# Patient Record
Sex: MALE | Race: WHITE
[De-identification: names, ages, dates, MRNs, and addresses within clinical notes are randomized per-mention and may not be internally consistent; named-entity substitution may affect disease eponyms.]

---

## 2016-12-28 NOTE — CPEKG
Heart Rate: 78

RR Interval: 769

P-R Interval: 164

QRSD Interval: 86

QT Interval: 400

QTC Interval: 456

P Axis: 43

QRS Axis: -54

T Wave Axis: -37

EKG Severity - ABNORMAL ECG -

EKG Impression: SINUS RHYTHM

EKG Impression: INFERIOR INFARCT, AGE INDETERMINATE

Preliminary Awaiting MD Review

## 2016-12-28 NOTE — DX
AP and lateral chest x-ray 1700 hours.

 

History: Chest pain.

 

Findings: Comparison to January 6, 2015.

 

Heart size remains upper limits of normal. Pulmonary vasculature is mildly prominent centrally stable
 in appearance. There is hazy increased markings inferior aspect right upper lobe could represent are
a of pneumonitis with similar appearance of the right lung base. Patchy infiltrate and consolidation 
is also suspected left lower lobe posteriorly. There are no effusions. Osseous structures are unchang
ed. There is stable mild anterior wedging superior endplate of T12.

 

Impression:

1. Pneumonitis suspected inferior aspect right upper lobe and right lung base with denser consolidati
on/pneumonia left lower lobe.

## 2016-12-28 NOTE — GHP
[f rep st]



                                                            HISTORY AND PHYSICAL





DATE OF ADMISSION:  2016



CHIEF COMPLAINT:  Syncope.



HISTORY:  This is an 87-year-old with past medical historythat includes coronary artery disease and a
trial fibrillation.  He is an extremely poor historian, but he presents with complaints of syncope an
d emesis.  At the time of my evaluation, patient is also stating that he has had 2-3 weeks of cough a
s well as fevers and chills.  In terms of the episode of syncope, when I asked him about that, he chidi
lly cannot give me much information other than to tell me that he was able to activate his alert neck
lace after he fainted.  He is clear that he lost consciousness and fainted.  When I asked him if he i
s weaker than usual, he states he is always weak.  I do note that he has had a couple of hospitalizat
ions here with similar circumstances and has been in nursing homes at least following one of those ad
missions.  He states he did not injure anything when he fell.  He really is not able to give me much 
other history secondary to what appears to be dementia.



PAST MEDICAL HISTORY:  Includes:

1.  Coronary artery disease.

2.  Atrial fibrillation.

3.  History of GI bleed, very brisk bleeding duodenal ulcer, for which he was hospitalized 1 year ago
.

4.  BPH with urinary retention.

5.  C difficile colitis in the past.

6.  In the past, he has had sacral decubitus ulcers.

7.  Diastolic heart failure.



PAST SURGICAL HISTORY:  Includes hip and ankle surgery.



FAMILY HISTORY:  Parents are .



SOCIAL HISTORY:  The patient lives alone.  When I asked him if he has family, he says no; but looking
 at the chart, there is mention of children he has that live not far away.  It is unclear how involve
d they are in his care.



REVIEW OF SYSTEMS:  A 10-point review of systems is obtained and negative except as per HPI.  It shou
ld be mentioned that this is limited by patient's cognitive dysfunction.



MEDICATIONS:  Include:

1.  Centrum Silver.

2.  Flomax.

3.  Protonix.

4.  Proscar.

5.  Clopidogrel.

6.  Aspirin.

7.  Atorvastatin.



ALLERGIES:  No known drug allergies.



PHYSICAL EXAM:  VITAL SIGNS:  /68, heart rate 66, respiratory rate 22, O2 sats 96% on 4 L, temp
erature is 36.7.  GENERAL APPEARANCE:  This is an elderly man.  He is awake and alert.  He is in no d
istress.  He does appear chronically ill.  EYES:  Anicteric; sclerae are injected bilaterally.  HEENT
:  Oropharynx clear.  CARDIOVASCULAR:  RRR.  There is a diffuse holosystolic murmur.  PULMONARY:  Ann
gs are clear to auscultation to anterior exam.  ABDOMEN:  Soft, nontender, nondistended.  EXTREMITIES
:  No clubbing, cyanosis, or edema.  SKIN:  Warm, dry, well perfused.  NEURO/PSYCH:  Patient is orien
lico x2, though he does have evidence of cognitive dysfunction and poor memory.



CLINICAL DATA:  EKG reviewed and interpreted.  It shows sinus rhythm, evidence of an old inferior inf
arct. 



Chest x-ray, reviewed and interpreted independently by myself, shows pneumonitis of the right upper l
obe and right lung base with denser consolidation/pneumonia in the left lower lobe.



LABORATORY DATA:  Notable for white blood cell count of 8.6, hematocrit of 40.  Chemistry is unremark
able.  Troponin is 0.013.



ASSESSMENT AND PLAN:  This is an 87-year-old man presenting with a syncopal episode at home as well a
s left lower lobe pneumonia.

1.  Left lower lobe pneumonia.  Possible aspiration, given patient's cognitive dysfunction.  Started 
on ceftriaxone and azithromycin.  Speech Language Pathology will evaluate.  We will also check a flu 
swab.  We will provide nebs and IS as well as cough and deep breathing treatments as patient does pati
ear to be profoundly weak.  

2.  Syncope, in the setting of pneumonia as per above.  He is also very weak and a very poor historia
n.  Will monitor on telemetry.  Will obtain serial troponins and EKGs.  His last echocardiogram was n
early 1 year ago, so that will be repeated in the morning to evaluate for any acute change.

3.  Atrial fibrillation.  He was previously on blood thinners, but these were discontinued after his 
large GI bleed last year.  He will be monitored on telemetry.  His EKG did show sinus rhythm.  He is 
rate controlled.  

4.  Coronary artery disease.  Will continue his outpatient medications which include statin, aspirin,
 and Plavix.  

5.  History of cerebrovascular accident, for which he is on Plavix and aspirin.  Unclear how this is 
relating to his cognitive dysfunction.  

6.  Acute-on-chronic encephalopathy.  Again, it is unclear what his baseline cognition is, but suspec
t it is slightly better than on presentation.  He is likely having some delirium in the setting of ch
ronic dementia.

7.  Code status.  In the past, he has been full code.  Given his confusion, this is not further addre
ssed, but his code status will be continued.  I will ask for a palliative consult to help clarify his
 goals of care, as it is unclear if he really understands what the question is and does sound as if blanca chong has some family involved, although he denies that they are.



DISPOSITION:  Inpatient status.  Suspect he will need greater than 48-hour stay for evaluation and ma
nagement of above given his multiple active comorbid conditions. 



The patient is new to my care.  Old records reviewed and summarized as per HPI and Past Medical Histo
ry.  Care plan reviewed with ER physician, including plans for treatment of pneumonia.





Job #:  [id number]

## 2016-12-28 NOTE — EDPHY
H & P


Smoking Status: Never smoked


HPI/ROS: 


CHIEF COMPLAINT:  syncope, vomiting, cough 





HISTORY OF PRESENT ILLNESS:  The patient is a 87-year-old male who presents to 

the emergency department via EMS after having a syncopal episode.  Patient 

states that he was walking and became weak.  He fell to the floor.  He awoke 

and had an episode of emesis.  EMS found the patient covered in his own emesis 

on the floor.  He had activated his Alert necklace.  EMS also reports the 

patient was mildly hypotensive and orthostatic on scene.  Patient denies any 

chest pain, shortness of breath or abdominal pain at this time.  He does state 

that he has had a cough for the past few days.  He denies fevers or chills.  No 

leg pain or swelling. He denies any new weakness or numbness.  He has baseline 

residual weakness on the right upper and lower extremity.  They are always 

weak. No visual change.





REVIEW OF SYSTEMS:  


My complete review of systems is negative except as mentioned in the HPI. (

Kate Mata)





Past Medical/Surgical History: 


Includes ACS, acute MI, atrial fibrillation, pneumonia, CVA





Past surgical history:  Includes stent placement, hip replacement





Social history:  The patient lives at home alone.  He does not smoke or use 

alcohol. (Kate Mata)





Physical Exam: 


Vitals noted


GENERAL:  Well-appearing, in no acute distress, alert.


HEENT:  Eyes normal to inspection, normal pharynx, no signs of dehydration.


NECK:  No thyromegaly, no lymphadenopathy, supple.


RESPIRATORY:  Clear to auscultation bilaterally, no rales, rhonchi or wheezing.


CVS:  Regular rate and rhythm, no rubs, murmurs, or gallops.


ABDOMEN:  Soft, nontender, nondistended, no organomegaly.


BACK:  Normal to inspection, no CVA tenderness.


SKIN:  Normal color, no rash, warm, dry.  No pallor.


EXTREMITIES:  No pedal edema, no calf tenderness, no Homans sign or cords, no 

joint swelling.


NEURO/PSYCH:  


Higher functions:  Alert and Oriented x3.  Normal speech and cognition.  Normal 

mood and affect.


Cranial nerves:  Normal as tested.


Cerebellar:  Normal as tested.  Good finger to nose, good heel-to-shin, normal 

gait.


Peripheral exam:  Slightly weak on the right upper and lower extremity.  This 

is when compared to the left. (patient states this is baseline)  Normal 

sensation.  Normal reflexes. (Kate Mata)





Constitutional: 





 Initial Vital Signs











Temperature (C)  37.3 C   12/28/16 16:51


 


Heart Rate  78   12/28/16 16:51


 


Respiratory Rate  23 H  12/28/16 16:51


 


Blood Pressure  141/68 H  12/28/16 16:51


 


O2 Sat (%)  95   12/28/16 16:51








 











O2 Delivery Mode               Room Air

















Allergies/Adverse Reactions: 


 





No Known Allergies Allergy (Unverified 01/14/15 09:19)


 








Home Medications: 














 Medication  Instructions  Recorded


 


Atorvastatin Calcium [Lipitor 40 40 mg PO DAILY 01/04/15





mg (*)]  


 


Clopidogrel Bisulfate [Plavix (*)] 75 mg PO DAILY 01/04/15


 


Tamsulosin HCl [Flomax 0.4 MG (*)] 0.4 mg PO DAILY #30 cap 01/10/15


 


Pantoprazole Sodium [Protonix 40mg 40 mg PO DAILY #30 tab 01/17/15





(*)]  


 


Aspirin EC [Aspirin EC 81 mg (*)] 81 mg PO DAILY 12/28/16


 


Finasteride [Proscar 5 MG (*)] 5 mg PO DAILY 12/28/16


 


Multivit-Min/FA/Lycopene/Lut 1 each PO DAILY 12/28/16





[Centrum Silver Ultra Men's Tab]  














Medical Decision Making





- Diagnostics


EKG Interpretation: 


Sinus rhythm. Q-wave in III, aVF.  No acute ST or T-wave abnormalities. (Kate Mata)





ED Course/Re-evaluation: 


In the emergency department I discussed the plan with the patient answered all 

his questions.  Laboratory studies, chest x-ray and EKG were ordered.





Patient's BNP was mildly elevated at 914. Lactic acid was 1.6.  Sodium was 

mildly high at 145.





Chest x-ray:  The patient was noted to have a left lower lobe pneumonia.





Blood cultures and Levaquin 750 mg IV was ordered.  I discussed the case with 

Dr. Weldon.  She accepted





I discussed the result with the patient.  I answered all his questions. 





1925:  The patient is doing well. No new shortness of breath or complaints. (

Kate Mata)





Differential Diagnosis: 


My differential includes but is not limited to dysrhythmia, ACS, acute MI, 

electrolyte abnormality, sugar abnormality, pneumonia, bacteremia, sepsis (

Kate Mata)





Other Provider: 


10:15 a.m. I was notified by nursing staff the patient has [facility] insurance.  I 

spoke with the [facility] transfer line who states that they request the patient 

stay here.  They also did not have Room in their facility. (Jose Salazar)








- Data Points


Medications Given: 





 








Discontinued Medications





Sodium Chloride (Ns)  500 mls @ 0 mls/hr IV ONCE ONE


   PRN Reason: As Directed


   Stop: 12/28/16 17:13


   Last Admin: 12/28/16 17:16 Dose:  500 mls


Levofloxacin/Dextrose (Levaquin 750 Mg (Premix))  150 mls @ 100 mls/hr IV EDNOW 

ONE


   PRN Reason: Protocol


   Stop: 12/28/16 19:21


   Last Admin: 12/28/16 18:06 Dose:  150 mls











Departure





- Departure


Disposition: [facility]


Clinical Impression: 


 Left lower lobe pneumonia, Syncope, Vomiting


Condition: Good

## 2016-12-29 NOTE — ECHO
[id number]

[id number]



+---------------------------------------------------+      [address]

:                                                   :       [place] [address]

:                                                   :           [phone]

+---------------------------------------------------+       Fax [phone]



                       Adult Echocardiographic Report



+----------------------------------------------------------------------------

--------+

:Name: VIANCA HOPPER HStudy Date: 2016 07:38 AM                    

        :

:MRN: [id number]          Hospital Admission Number: [id number] Amirah lora: ER:

:: 1929          Gender: Male                           Height: 68 i

n       :

:Age: 87 yrs              Race: WH                               Weight: 153 

lb      :

:Reason For Study: syncope                                                   

        :

:                                                                BSA: 1.8 met

ers2    :

:History: Stent/atrial fibrillation                                          

        :

+----------------------------------------------------------------------------

--------+

MMode/2D Measurements & Calculations

LVIDd: 5.9 cm      EDV(Teich): 172.5 ml MV Diam: 3.3 cmAo root diam: 3.4 cm

                                                       LA dimension: 4.6 cm

        _____________________________________________________________



LVOT diam: 2.1 cm

LVOT area: 3.3 cm2



Normal Measurement Values:

+----------------------------------------------------------------------------

----------------------------------+

:LVIDd (3.5-5.7cm)    IVSd (0.6-1.1cm)     LVPWd (0.6-1.1cm)     Aortic Root 

(2.0-3.7cm)Left Atrium (1.5-4.0cm):

:LV Vol(d) (76-115ml) LV Vol(s) (29-48ml)  Ejec Fraction (50-65%)PV Rohit (0.6-

1.2m/s)    TV Rohit (0.4-1.0m/s)    :

:MV E Rohit (0.8-1.0m/s)MV A Rohit (0.3-1.0m/s)LVOT Rohit (0.7-1.2m/s) Asc Ao Rohit (

0.9-1.8m/s)                       :

+----------------------------------------------------------------------------

----------------------------------+

Doppler Measurements & Calculations

MV E max rohit:      MV V2 mean:         Ao mean PG:        LV V1 max:

74.0 cm/sec        51.1 cm/sec         17.3 mmHg          75.0 cm/sec

MV A max rohit:      MV mean PG:         Ao V2 mean:        LV V1 max P.2 cm/sec        1.2 mmHg            195.5 cm/sec       2.3 mmHg

MV E/A: 1.2        MV V2 VTI: 38.5 cm  Ao V2 VTI: 75.7 cm LV V1 mean PG:

                   MV area (1 diam):                      1.1 mmHg

                                       ADAM(I,D): 0.95 cm2 LV V1 mean:

                   8.5 cm2                                49.8 cm/sec

                   MVA(VTI): 1.9 cm2                      LV V1 VTI: 21.7 cm

                   MV Flow area

                   (1diam): 8.5 cm2



        _____________________________________________________________

MR max rohit:        MR(RF 1 diam):      SV(MV 1 diam):     TR max rohit:

510.4 cm/sec       12.5 %              327.7 ml           238.9 cm/sec

MR max PG:                             SI(MV 1 diam):     TR max P.2 mmHg                                                22.8 mmHg

                                       179.6 ml/m2        RAP systole:

                                       SV(LVOT): 71.6 ml  10.0 mmHg

                                                          RVSP(TR):

                                                          32.8 mmHg



        _____________________________________________________________

RF(MV,Ao)(1 diam):

-1.1

RF(MV,LVOT)

(1diam): 0.78



Left Ventricle

The left ventricle is normal in size. There is normal left ventricular wall

thickness. Ejection Fraction = 50-55%. LV basal and mid inferior walls are

akinetic. All remaining segments have normal motion.







Right Ventricle

The right ventricle is normal in size and function.



Atria

The left atrium is moderate to severely dilated. Right atrial size is

normal. The interatrial septum is intact with no evidence for an atrial

septal defect.



Mitral Valve

Chordal MV NIDA without obstruction. There is moderate mitral annular

calcification. There is no evidence of mitral valve prolapse. There is no

mitral valve stenosis. There is moderate mitral regurgitation.



Tricuspid Valve

Normal tricuspid valve. Right ventricular systolic pressure is normal. There

is mild to moderate tricuspid regurgitation.



Aortic Valve

Moderate-Severe Aortic Valve Calcification. Mild valvular aortic stenosis.

AV max PG is 31mmHG. AV mean PG is 17mmHG.



Pulmonic Valve

The pulmonic valve is normal in structure and function. There is no pulmonic

valvular regurgitation.





Great Vessels

The aortic root is normal size.



Pericardium/Pleural

There is no pericardial effusion.



Conclusion

A complete two-dimensional transthoracic echocardiogram was performed (2D,

M-mode, Doppler and color flow Doppler).

Ejection Fraction = 50-55%.

LV basal and mid inferior walls are akinetic. All remaining segments have

normal motion.

The left atrium is moderate to severely dilated.

Chordal MV NIDA without obstruction.

There is moderate mitral annular calcification.

There is moderate mitral regurgitation.

There is mild to moderate tricuspid regurgitation.

Right ventricular systolic pressure is normal.

Moderate-Severe Aortic Valve Calcification

Mild valvular aortic stenosis.

AV max PG is 31mmHG. AV mean PG is 17mmHG.





_____________________________________________________________________________





Final Reading Physician:

                        Halle Mott signed on 2016 09:55 AM

Ordering Physician: Felix Weldon

Performed By: Nadia Desouza, VON

## 2016-12-29 NOTE — HOSPPROG
Hospitalist Progress Note


Assessment/Plan: 


GPC bacteremia - 1/2 BCx's positive for GPC's.  Will add Vanc to Erta while 

awaiting further culture data.  No obvious skin source





LLL PNA - suspect aspiration, wbc's up this am.  Will change atbx to Ertapenem, 

await cultures, add sputum culture.  Speech / swallow eval planned.





Syncope - bradycardia noted.  Echo reviewed, EF 50-55% with basal and mid 

inferior wall akinesis, mild AS and mod MR.  EKG shows Q waves inferior leads, 

which were present in 2015.  


   -cardiology consult planned


   -consider outpt event monitor at d/c


   -cont tele while here





A fib - HR slow, 40's.  No AV jerrod blockers on board.  Cards to consult as 

above





CAD - CP free, continue outpt meds.





H/O CVA - on ASA, plavix





Encephalopathy - ?acute on chronic in setting of infection





DVT PPLX - Lovenox





Full code





Dispo - inpt








Subjective: Pt feels a bit better today.  Denies CP, SOB, cough or fever.  

Overall poor historian.


Objective: 


 Vital Signs











Temp Pulse Resp BP Pulse Ox


 


 36.7 C   48 L  18   100/60   98 


 


 12/28/16 18:54  12/29/16 11:08  12/29/16 11:08  12/29/16 11:08  12/29/16 11:08








 Laboratory Results





 12/29/16 04:58 





 12/29/16 04:58 











- Physical Exam


Constitutional: no apparent distress


Eyes: PERRL


Ears, Nose, Mouth, Throat: moist mucous membranes


Cardiovascular: regular rate and rhythym


Respiratory: no respiratory distress, inspiratory crackles


Gastrointestinal: normoactive bowel sounds, soft, non-tender abdomen


Skin: warm


Neurologic: AAOx3


Psychiatric: poor memory





ICD10 Worksheet


Patient Problems: 


 Problems











Problem Status Diagnosed


 


Left lower lobe pneumonia Acute 


 


Syncope Acute 


 


Vomiting Acute 


 


C. difficile diarrhea Acute 


 


Cervical stenosis of spinal canal Acute 


 


GI bleed Acute 


 


Pneumonia Acute

## 2016-12-29 NOTE — GCON
[f 
rep st]



                                                                    CONSULTATION





CARDIOLOGY CONSULTATION





INDICATIONS FOR CONSULTATION:  Known history of CAD, echocardiogram showing 
wall motion abnormality, mild troponin bump, and possible syncopal event.



HISTORY OF PRESENT ILLNESS:  Partial HPI came from the patient's chart and from 
patient due to patient being mildly confused since admission to hospital.  The 
patient is an 87-year-old male, reporting last evening around 4:00 feeling 
quite weak, his legs gave out, falling to the ground.  Reporting that he landed 
on his back and his head. Reporting no injury but unable to get up.  Reporting 
he was able to push his Life Alert to activate EMS.  He states that he did not 
lose consciousness but is somewhat confused.  Per report from the hospitalist 
services, he did report that he did lose consciousness but was uncertain of how 
long he was actually out.  Patient does report that he has been feeling weaker 
than usual.  He denies at the time of his fall/possible syncopal event any 
palpitations.  Denies any recent history of chest pain, pressure or symptoms 
suggesting ischemia.  Does report history of feeling fatigued for the last few 
weeks.  Denies any orthopnea, PND, edema, sudden weight gain.  Denies any 
symptoms suggestive of TIA or CVA.  He does state that he has a past medical 
history of coronary artery disease, with previous MI in 2013 (sees [facility], procedure done at [facility]).  He has a history of 
hyperlipidemia.  He had been on blood thinners at one point but had been 
discontinued.  At current time, he reports he has no chest pain or shortness of 
breath since hospitalization.  Upon arrival in the emergency department, 
electrocardiogram was done, showing sinus rhythm with Q-waves noted in leads 
III and AVF and nonspecific T-wave abnormalities in inferolateral leads.  His 
initial laboratory studies showed negative troponin. He did undergo 
echocardiogram this morning showing normal ejection fraction at 50% to 55% but 
was noted to have LV basal and mild inferior wall akinesis, moderate mitral 
annular calcification, moderate MR, mild-to-moderate TR. RVSP was estimated to 
be normal.  Moderate-to-severe aortic valve calcification, mild aortic stenosis
, with peak gradient at 31 mmHg and mean gradient at 17 mmHg.  Chest x-ray done 
showed pneumonitis suspected inferior aspect of the right upper lobe and right 
lung base with denser consolidation pneumonia in the left lower lobe.  Patient 
has been started on antibiotic therapy.  He did have a blood culture drawn that 
did show positive for gram-positive cocci.



PAST MEDICAL HISTORY:  

1.  History of coronary artery disease with previous myocardial infarction; 
patient reporting 3 years ago, with stent implantation at [facility].  

2.  History of paroxysmal atrial fibrillation.

3.  History of GI bleed.

4.  BPH with urinary retention.

5.  C difficile in the past.  

6.  Diastolic heart failure.



PAST SURGICAL HISTORY:  Includes hip and ankle surgery, previous percutaneous 
coronary intervention of unknown vessel.



FAMILY HISTORY:  The patient denies any family history of coronary artery 
disease.



SOCIAL HISTORY:  The patient is , lives alone.  He has 3 adult children 
who live in [place].  He is a retired .  He reports occasional 
cigar smoke smoking and no long-term tobacco abuse. Occasional alcohol. Denies 
of any illicit drug use.



ALLERGIES:  The patient has known drug allergies.



HOME MEDICATIONS:  Include Flomax 0.4 mg p.o. daily, multivitamin 1 p.o. daily, 
Protonix 40 mg p.o. daily, Proscar 5 mg p.o. daily, Clopidogrel 75 mg p.o. daily
, aspirin 81 mg p.o. daily, Atorvastatin 40 mg p.o. daily.



REVIEW OF SYSTEMS:  A 10-point review of systems done on this patient, all 
negative except as mentioned above.



PHYSICAL EXAMINATION:  GENERAL APPEARANCE:  Medium built, well groomed, 
 male.  He is alert and orientated to person, place, time, and 
situation.  VITAL SIGNS:  Current vital signs are blood pressure of 94/51.  
Heart rate is 54 and regular respirations are 18, saturating 96% on 1 L nasal 
cannula.  Temperature 36.5 degrees Celsius.  HEENT:  Head is normocephalic.  
Lips and tongue are pink and moist with no signs of cyanosis.  Conjunctiva 
pink.  NECK:  Trachea is midline, +2 carotid pulses bilateral.  No auscultated 
bruits, 5 cm of jugular vein elevation above the sternal notch, with the 
patient sitting at a 45-degree angle.  RESPIRATORY:  Lungs, upper lobes clear, 
diminished in bases bilateral.  No rhonchi, rales or wheezing noted.  No 
accessory muscle use.  No intercostal muscle retraction noted.  CARDIAC:  
Regular rate, regular rhythm.  S1, S2.  No S3 noted. 2/6 systolic murmur along 
left sternal border.  ABDOMEN:  Soft, nontender, bowel sounds x4 quadrants.  No 
organomegaly. SKIN:  Pink, warm, dry.  No cyanosis, no clubbing, no peripheral 
edema.  VASCULAR:  +2 carotids bilateral, +2 radials bilateral, +1 dorsal pedal 
and posterior tibial pulses bilateral.



LABORATORY STUDIES:  Today's white blood cell count is 15.86, hemoglobin 11.5, 
hematocrit 31.1, platelet count of 185.  Sodium 141, potassium 4.4, chloride 107
, CO2 27, BUN 24, creatinine 1.1.  Glucose 90, calcium 8.6.  Patient has had 4 
troponin levels drawn: Initial of 0.013, repeated was 0.033. This morning at 4:
58, it was mildly elevated at 0.038 and repeat troponin this afternoon at 3:00 
was 0.028.  UA showed positive for nitrites, 2+ ketones, RBCs, WBCs, bacteria, 2
+ mucus.  Influenza A and B was negative for flu.  Blood cultures: 1 came back 
for gram-positive cocci.  UA and culture pending.  2nd blood culture pending.



STUDIES:  Chest x-ray as mentioned above.  Echocardiogram as mentioned above.  
Electrocardiogram as mentioned above.



ASSESSMENT AND PLAN:  

1.  Possible syncopal event:  Patient has remained in sinus rhythm per 
emergency department notes and [facility] staffing.  No arrhythmias noted.  He has 
been mildly bradycardic.  He is currently not on any AV jerrod agent.  Does have 
history of paroxysmal atrial fibrillation.  Question if potential arrhythmia 
may have produced the patient's possible syncopal event.  Will continue to 
monitor him on cardiac monitoring.  Potentially his pneumonia and infection 
could have contributed to the syncopal event also.

2.  Coronary artery disease:  Patient denies any chest pain. Noted to have Q-
waves in inferior leads and nonspecific T-wave abnormalities.  This EKG is not 
significantly different from previous EKG, dated January 4, 2015.  Did have him 
on a very mild troponin bump.  He did have a previous echocardiogram done 
during previous hospital admission on January 6, 2015, which did note no wall 
motion abnormality at that time.  No significant change in valvular heart 
disease.  At this time, he will continue on current anti-platelet therapy of 
aspirin and clopidogrel.  Will not start him on beta-blocker due to 
bradycardia.  Will repeat another troponin level tomorrow with a.m. labs.  Due 
to his possible sepsis and pneumonia, and medically treat at this time, but 
before hospital discharge, he should be further risk evaluated by a MPI study. 
   If he does start experiencing chest pressure, or has significant elevation 
in troponin then we may consider proceeding with coronary angiogram.  I have 
requested the nursing staff to get his previous cath reports and any other 
significant cardiology study from [facility].   

3.  Hyperlipidemia:  The patient is currently on statin therapy of 
atorvastatin.  Would like to have him get a fasting lipid panel in a.m.  

4.  Paroxysmal atrial fibrillation: The patient currently in sinus rhythm.  It 
has been noted in his chart that he had been previously on blood thinners, but 
was discontinued due to GI bleed last  year.  Patient reports he is uncertain 
of this.  Will continue him on continuous cardiac monitor.  No AV jerrod agent 
at this time due to bradycardia.

5.  Valvular heart disease:  Patient's echocardiogram showed moderate mitral 
annular calcification, moderate MR, mild-to-moderate TR. mild aortic stenosis.  
This is unchanged from previous echocardiogram.

6.  Lower lobe pneumonia:  The patient's chest x-ray showed lower lobe 
pneumonia.  The patient has positive blood culture for Escherichia coli, has 
been started on antibiotic therapy by Hospitalist Services, including 
vancomycin.  

Thank you for this consultation.  Will be glad to follow along with you.





Job #:  [id number]

MTDD

## 2016-12-30 NOTE — HOSPPROG
Hospitalist Progress Note


Assessment/Plan: 


GPC bacteremia - 1/2 BCx's positive for GPC's.  Staph on UCx, also has e/o LLL 

PNA.  Cont vanc, erta while awaiting further culture data.





LLL PNA - concern for aspiration.  Cont Ertapenem, await cultures and 

sensitivities.  Speech / swallow eval planned.





Syncope - bradycardia noted.  Echo reviewed, EF 50-55% with basal and mid 

inferior wall akinesis, mild AS and mod MR.  EKG shows Q waves inferior leads, 

which were present in 2015.  May have had syncope as a result of infection.


   -cardiology consult appreciated   





A fib - HR slow, 40's at times.  No AV jerrod blockers on board. 





CAD - trop slightly elevated though CP free, continue outpt meds, defer beta 

blocker due to bradycardia


   -Lexiscan prior to dc





H/O CVA - on ASA, plavix





Encephalopathy - ?acute on chronic in setting of infection





DVT PPLX - Lovenox





Full code





Dispo - inpt








Subjective: Pt feels fine.  No complaints.


Objective: 


 Vital Signs











Temp Pulse Resp BP Pulse Ox


 


 36.5 C   52 L  16   118/60   97 


 


 12/30/16 07:46  12/30/16 07:46  12/30/16 07:46  12/30/16 07:46  12/30/16 07:46








 Microbiology











 12/29/16 17:30  - Final





 Sputum, Expectorated 








 Laboratory Results





 12/30/16 04:26 





 12/30/16 04:26 





 











 12/29/16 12/30/16 12/31/16





 05:59 05:59 05:59


 


Intake Total  462 


 


Output Total  300 


 


Balance  162 














- Physical Exam


Constitutional: no apparent distress


Eyes: PERRL


Ears, Nose, Mouth, Throat: moist mucous membranes


Cardiovascular: regular rate and rhythym


Respiratory: no respiratory distress, clear to auscultation


Gastrointestinal: normoactive bowel sounds, soft, non-tender abdomen


Skin: warm


Neurologic: AAOx3


Psychiatric: poor memory





ICD10 Worksheet


Patient Problems: 


 Problems











Problem Status Diagnosed


 


Left lower lobe pneumonia Acute 


 


Palliative care encounter Acute 


 


Syncope Acute 


 


Vomiting Acute 


 


C. difficile diarrhea Acute 


 


Cervical stenosis of spinal canal Acute 


 


GI bleed Acute 


 


Pneumonia Acute

## 2016-12-30 NOTE — PDPCPN
Palliative Care Progress Note


Assessment/Plan: 


Referring provider: Dr Simon





Reason for consult:


Complex medical decision making


Symptom control





HPI:


Jcarlos Trujillo is a 86 yo male with PMH CAD, CHF, a fib, and CHF admitted to 

the hospital for syncope, emesis, cough, fever, chills. Found to have LLL PNA 

and UTI with possible bacteremia with 1/2 blood cultures +. Started on 

antibiotics and fluids with improvement. Palliative care consulted for complex 

medical decision making. 





Met with sister in law Haydee at the bedside this morning with Oziel. Oziel 

stated he felt he was doing well at home until recently when he fell and used 

his life alert to call for help. We discussed his current medical problems with 

UTI, PNA, and possible bacteremia. Oziel stated he is feeling much better and 

was hoping to go home today. We discussed the recommendation by therapies for 

SNF right now for weakness and to help him be independent again. Oziel was 

reluctant to consider any type of rehab as he really just prefers to be at home 

alone. He states he is able to complete all of his ADLs by himself. He does 

have a cleaning lady but otherwise is still driving and independent. With herb'

s permission I spoke with Haydee outside of the room. 





Haydee shared that since his fall 2 years ago Oziel has never really recovered 

in terms of mobility. She feels he is still unsteady on his feet at home with 

worsening over the past few months. She has noticed him becoming more confused 

at times especially over eulalio when he could not remember directions to 

their house. Haydee states it is hard to speak with Oziel about his feelings or 

what is going on because he does not communicate much and will become verbally 

angry if you ask too many questions or go against his wishes. Haydee believes 

he is hoping to die in his home. We discussed his confusion and unsure if this 

is because of his acute situation or more prominent now that he is sicker. 

Haydee states she has noticed at times some confusion but hard to tell when 

this started or if he is safe at home alone. Discussed code status which Haydee 

states she believe his wishes are for continued Full code. She had asked him at 

the time the MOST form was filled out if this was really his wish and he stated 

yes. She believes at that time he fully understood his choice and the risks/

benefits of each option. She believes he is not safe at this time home alone 

and wishes for him to go to rehab at discharge. 





Assessment:


Physical:


- Pain: denies pain


   - Tylenol PRN





- Cough:


   - on nebs


   - oxygen as needed


   - could also add guaifenesin if needed 





Emotional/psychological:


- Acute encephalopathy: unsure what his true baseline is


   - maintain normal routines


   - reassure 





Advanced Care Planning:


   Is patient decisional?: no


   Code Status: Full- confirmed today with WILLIS SOFIA POA: Sister in law Haydee is MDPOA. 





Plan: Will likely need SNF at discharge. Unsure what his mental baseline is. 

Previously lived alone, driving, with only a cleaning person for support. 

Family is not able to be direct caregivers. 





Subjective: 


i'm feeling better can I go home today





Objective: 


Social History: Retired . Has 3 children in [place] and 1 son in 

[place]. 2 brothers live locally in [place]. 





Medication list reviewed 





ROS: 


General: fatigue, weakness


ENT: negative


Resp: dyspnea, cough


GI: negative


: negative


MS: negative


Skin: negative


Neuro: negative


Psych: confusion





Functional assessment:


   PPS: 50%


   Functional status: previously lived alone, now needs some assistance with 

ADLs





 Vital Signs











Temp Pulse Resp BP Pulse Ox


 


 36.2 C   59 L  18   106/69   94 


 


 12/30/16 11:03  12/30/16 11:03  12/30/16 11:03  12/30/16 11:03  12/30/16 11:03








 Microbiology











 12/29/16 17:30  - Final





 Sputum, Expectorated 








 Laboratory Results





 12/30/16 04:26 





 12/30/16 04:26 





 











 12/29/16 12/30/16 12/31/16





 05:59 05:59 05:59


 


Intake Total  462 


 


Output Total  300 


 


Balance  162 














Physical Exam





- Physical Exam


General Appearance: alert, no apparent distress


Respiratory: decreased breath sounds, No respiratory distress, No accessory 

muscle use


Skin: normal color, warm/dry


Extremities: No pedal edema


Neuro/Psych: alert, other (some confusion and poor short term memory)





ICD10 Worksheet


Patient Problems: 


 Problems











Problem Status Diagnosed


 


Left lower lobe pneumonia Acute 


 


Palliative care encounter Acute 


 


Syncope Acute 


 


Vomiting Acute 


 


C. difficile diarrhea Acute 


 


Cervical stenosis of spinal canal Acute 


 


GI bleed Acute 


 


Pneumonia Acute 














- ICD10 Problem Qualifiers


(1) Palliative care encounter

## 2016-12-30 NOTE — PDCARPN
Cardiology Progress Note


Chief Complaint: 


Patient reports does not know why he has to spend this much time in the 

hospital.


Assessment/Plan: 


Assessment:


A 70-year-old male admitted for possible syncopal event on 1/29/2016.  Known 

history of CAD with previous MI 3 years ago done at [facility], 

paroxysmal atrial fibrillation (currently not on anticoagulation due to history 

of GI bleed), valvular heart disease (moderate MR, mild-to-moderate TR,  mild AS

) hypercholesteremia, hypertension.  Patient reporting no symptoms of chest 

pain or shortness breath.  A chest x-ray did show pneumonia, he has had 

positive urine cultures (Stphylococcus Aureus) and blood cultures (Sreptococcus 

Pneumonias) and is currently being treated with IV and miotic therapy of 

vancomycin controlled by the hospitalist services.  On admission 

electrocardiogram noted with Q-waves in leads III and AVF, with nonspecific T-

wave abnormalities in inferior lateral leads (not significantly different from 

previous EKGs).  He was also noted to have mild troponin bump (0.038) and on a 

downward decline, most recent troponin 0.017.  Echocardiogram done 1/29/2016 

showed EF of 50-55%, LV base and mid inferior walls are akinetic LA is 

moderately to severely dilated, chordal MV NIDA without obstruction, moderate MR 

mild-to-moderate TR, RVSP within limits. Mild aortic stenosis with peak 

gradient at 31 mm Hg and mean at 17 mm Hg.  Wall motion is new since previous 

echocardiogram (1/6/2016)  At this time, patient reports he has no further 

episodes of chest pain or symptoms suggesting ischemia.  Reviewing continuous 

cardiac monitoring he has remained sinus bradycardia (rates  40s to 60s) with 

occasional PAC, occasional PVC, and episode of ventricular couplets.  No 

significant pauses or any other malignant arrhythmias.  A.m. electrocardiogram 

is unchanged from admission except for occasional PAC.





Plan:





1.  Syncopal/Near-syncope:  No further events since hospitalization.  No 

malignant arrhythmias noted on telemetry.  Continue telemetry monitoring.  BP 

improved today with antibiotic therapy.  Potential causes event could be 

connected to infection.





2. CAD:  No chest pain, slight troponin elevation which has returned to normal 

levels.  Echo showing normal EF but with basal and mid inferior wall akinesis.  

Medical therapy at that time, continue aspirin and clopidogrel.  No beta-

blocker due to bradycardia.  Will plan on doing [facility] MPI before hospital 

discharge.





3.  Hyperlipidemia:  Fasting lipid panel showed adequate suppression of LDL, 37

, continue on home dose of atorvastatin.





4.  Paroxysmal atrial fibrillation:  Remains in sinus rhythm, no AV jerrod 

agents due to bradycardia.  Not on anticoagulation due to history of GI bleed.  

Continue on aspirin as Plavix as stated above. 





5.  Pneumonia/UTI:  Continue on antibiotic therapy set forth by hospitalist 

services.





12/30/16 15:30





Subjective: 


Patient denies of any chest pain, shortness of breath, lightheadedness, 

palpitations, orthopnea, PND.


Reviewed/Discussed With: hospitalist (Dr Cushing), other (Dr Beavers)


Objective: 





 Vital Signs (8 Hrs)











  Temp Pulse Resp BP Pulse Ox


 


 12/30/16 11:03  36.2 C  59 L  18  106/69  94


 


 12/30/16 07:46  36.5 C  52 L  16  118/60  97








 Intake/Output (24 Hrs)











 12/29/16 12/30/16 12/31/16





 05:59 05:59 05:59


 


Intake Total  462 


 


Output Total  300 


 


Balance  162 


 


Intake:   


 


  Oral (ml)  200 


 


  IV Intake (ml)  262 


 


Output:   


 


  Urine (ml)  300 


 


    Urinal  300 


 


Other:   


 


  Weight  69.4 kg 











Result Diagrams: 


 12/30/16 04:26





 12/30/16 04:26


Cardiac Labs: 





 Cardiac Lab Results (72 Hrs)











  12/30/16 12/29/16 12/29/16





  04:26 15:22 04:58


 


Troponin I  0.017  0.028  0.038 H














  12/28/16





  20:45


 


Troponin I  0.033














- Physical Exam


Constitutional: WDWN, no apparent distress, other (Elderly male)


Ears, Nose, Mouth, Throat: moist mucous membranes


Cardiovascular: regular rate and rhythm, systolic murmur (2/6 upper sternal 

border), pulses symmetric bilat, No no rubs, No jugular vein distention, No 

carotid bruit


Peripheral Pulses: 1+: dorsalis-pedis (R), dorsalis-pedis (L), 2+: carotid (R), 

carotid (L)


Respiratory: other (Lungs diminished in bases bilateral, no rhonchi, rales, or 

wheezing noted.  No accessary muscle use, no intercostal muscle retraction noted

)


Gastrointestinal: normoactive bowel sounds, no tenderness, no masses


Skin: warm, no edema


Neurologic: AAOx3


Psychiatric: cooperative, interactive, following commands





ICD10 Worksheet


Patient Problems: 


 Problems











Problem Status Diagnosed


 


Left lower lobe pneumonia Acute 


 


Palliative care encounter Acute 


 


Syncope Acute 


 


Vomiting Acute 


 


C. difficile diarrhea Acute 


 


Cervical stenosis of spinal canal Acute 


 


GI bleed Acute 


 


Pneumonia Acute

## 2016-12-31 NOTE — PDCARPN
Cardiology Progress Note


Assessment/Plan: 


Assessment/Plan: 87-year-old male with coronary disease.  History of RCA 

stenting in 2014 at [facility].  Other issues include paroxysmal atrial fibrillation

, valvular heart disease with moderate mitral regurgitation and mild aortic 

stenosis.  Was admitted on December 28 with a fall at home.  He was found to 

have pneumonia, urinary infection, and bacteremia.  Troponin was only minimally 

elevated once and then has normalized.





1. coronary disease:  Has an ejection fraction of 50-55% with inferior 

hypokinesis seen on echocardiogram.  This is similar to previous assessment of 

his LV systolic function at [facility].  I do not think that he had acute coronary 

syndrome as an explanation for his fall at home.  Continue dual antiplatelet 

therapy and statin.  He is not on a beta-blocker due to known bradycardia.  

Could consider further risk stratification with outpatient nuclear stress test 

at [facility].  I do not feel that a nuclear stress test is necessary during his 

hospitalization here.





2.:  Bacteremia, likely related to pneumonia.  Being seen by infectious 

disease.  On appropriate antibiotic therapy.





3. Paroxysmal fibrillation:  Currently in sinus rhythm/ sinus bradycardia.  His 

previous anticoagulation was stopped due to history of GI bleed last year.





4. anemia:  History of GI bleed in the past.  Stable here.





5. Bradycardia:  Given the patient's history, I do not think that his fall was 

related to cardiac syncope.  He has had bradycardia but no significant pauses 

on telemetry.  Continue telemetry.





We will sign off.  Please call with questions.  Thank you


























12/31/16 11:18





Subjective: 


 Mr. Maynard denies chest discomfort or dyspnea.  States that the reason he 

came to the hospital was that he was walking, his legs felt very weak, and he 

fell.  He denies dizziness or lightheadedness or chest pain prior to falling.


Objective: 





 Vital Signs (8 Hrs)











  Temp Pulse Resp BP Pulse Ox


 


 12/31/16 08:00  36.4 C  50 L  17  104/52 L  96


 


 12/31/16 03:54  37.1 C  49 L  14  103/52 L  99








 Intake/Output (24 Hrs)











 12/30/16 12/31/16 01/01/17





 05:59 05:59 05:59


 


Intake Total 462 400 


 


Output Total 300 200 


 


Balance 162 200 


 


Intake:   


 


  Oral (ml) 200 400 


 


  IV Intake (ml) 262  


 


Output:   


 


  Urine (ml) 300 200 


 


    Urinal 300 200 


 


Other:   


 


  Weight 69.4 kg  


 


  Number of Voids   


 


    Urinal  1 








 no acute distress


JVP less than 10. Regular rate and rhythm with 2/6 holosystolic murmur at the 

left sternal border and a soft early systolic ejection murmur at the base.


Lungs clear to auscultation bilateral without wheezes rhonchi or rales


Extremities are warm well perfused without cyanosis clubbing edema


Result Diagrams: 


 12/31/16 04:24





 12/31/16 04:24


Cardiac Labs: 





 Cardiac Lab Results (72 Hrs)











  12/30/16 12/29/16 12/29/16





  04:26 15:22 04:58


 


Troponin I  0.017  0.028  0.038 H














  12/28/16





  20:45


 


Troponin I  0.033











EKG: 


 sinus rhythm and sinus bradycardia with occasional PACs





ICD10 Worksheet


Patient Problems: 


 Problems











Problem Status Diagnosed


 


Left lower lobe pneumonia Acute 


 


Palliative care encounter Acute 


 


Syncope Acute 


 


Vomiting Acute 


 


C. difficile diarrhea Acute 


 


Cervical stenosis of spinal canal Acute 


 


GI bleed Acute 


 


Pneumonia Acute

## 2016-12-31 NOTE — HOSPPROG
Hospitalist Progress Note


Assessment/Plan: 


Strep bacteremia - 1/2 BCx's, sensitive to Ceftriaxone.  Suspect source is PNA.

  


   -tailor atbx to Ceftriaxone


   -repeat BCx's today


   -will need PICC once repeat BCx's demonstrate clearance


   -ID consulted, plan for 2 weeks IV atbx





LLL PNA - concern for aspiration on admission so initially treated with 

Ertapenem.  No e/o aspiration per speech/swallow eval.  Change to Ceftriaxone 

as above.





?UTI - initial UA c/w infection and staph on UCx, sensitive to cephalosporin.  

No clear symptoms.  Unclear if he's colonized, though will be treated based on 

above tx plan.  





Syncope - bradycardia noted.  Echo reviewed, EF 50-55% with basal and mid 

inferior wall akinesis, mild AS and mod MR.  EKG shows Q waves inferior leads, 

which were present in 2015.  May have had syncope as a result of infection.


   -cardiology consult appreciated   





A fib - Sinus osman here.  No AV jerrod blockers on board. 





CAD - trop slightly elevated though CP free, continue outpt meds, defer beta 

blocker due to bradycardia


   -Lexiscan prior to dc





H/O CVA - on ASA, plavix





Encephalopathy - acute on chronic in setting of infection, improving





DVT PPLX - Lovenox





Full code





Dispo - cont inpt.  will need SNF at d/c, CM involved.








Subjective: Pt feels well.  He reports persistent cough.  Denies CP, SOB.  No 

fevers.  He remains weak.  No further syncope.  No events on tele.


Objective: 


 Vital Signs











Temp Pulse Resp BP Pulse Ox


 


 36.4 C   50 L  17   104/52 L  96 


 


 12/31/16 08:00  12/31/16 08:00  12/31/16 08:00  12/31/16 08:00  12/31/16 08:00








 Microbiology











 12/28/16 21:14 Urine Culture - Final





 Urine,Clean Catch    Staphylococcus Aureus





    Two [place] Types


 


 12/29/16 17:30  - Final





 Sputum, Expectorated 








 Laboratory Results





 12/31/16 04:24 





 12/31/16 04:24 





 











 12/30/16 12/31/16 01/01/17





 05:59 05:59 05:59


 


Intake Total 462 400 


 


Output Total 300 200 


 


Balance 162 200 














- Physical Exam


Constitutional: no apparent distress


Eyes: PERRL


Ears, Nose, Mouth, Throat: moist mucous membranes


Cardiovascular: regular rate and rhythym


Respiratory: no respiratory distress, clear to auscultation


Gastrointestinal: normoactive bowel sounds, soft, non-tender abdomen


Skin: warm


Neurologic: AAOx3


Psychiatric: interacting appropriately





ICD10 Worksheet


Patient Problems: 


 Problems











Problem Status Diagnosed


 


Left lower lobe pneumonia Acute 


 


Palliative care encounter Acute 


 


Syncope Acute 


 


Vomiting Acute 


 


C. difficile diarrhea Acute 


 


Cervical stenosis of spinal canal Acute 


 


GI bleed Acute 


 


Pneumonia Acute

## 2017-01-01 NOTE — PCMIDPN
Assessment/Plan: 


Assessment:





Left lower lobe lobar pneumonia with Streptococcus pneumoniae bacteremia.  

Isolate is pan sensitive.  Patient is on monotherapy with ceftriaxone 1 g 

daily.  Seems to be improving.  Has an underlying bradycardic arrhythmias which 

is being investigated by Cardiology although the patient clinically appears 

stable.











Plan:


1. Continue ceftriaxone intravenously. 


2. Follow repeat blood cultures.


3. Once patient is stabilized I have some concerns about using fluoroquinolones 

given his underlying bradycardic arrhythmias.  Will confer with Cardiology to 

determine safety of fluoroquinolones in this patient.














01/01/17 15:46





Subjective: 


Patient is sitting up in his chair in his hospital room watching television.  

Looks and feels better than yesterday.  Denies any significant fevers or 

chills.  No new complaints.


Objective: 


Ceftriaxone # 1 Vital Signs











Temp Pulse Resp BP Pulse Ox


 


 36.6 C   47 L  20   97/50 L  96 


 


 01/01/17 11:30  01/01/17 11:30  01/01/17 11:30  01/01/17 11:30  01/01/17 11:30








 Microbiology











 12/29/16 17:30  - Final





 Sputum, Expectorated Sputum Culture - Final


 


 12/28/16 21:14 Urine Culture - Final





 Urine,Clean Catch    Staphylococcus Aureus





    Two [place] Types








 Laboratory Results





 12/31/16 04:24 





 12/31/16 04:24 





 











 12/31/16 01/01/17 01/02/17





 05:59 05:59 05:59


 


Intake Total 400  


 


Output Total 200 550 


 


Balance 200 -550 














- Physical Exam


General Appearance: WD/WN, alert, no apparent distress, non-toxic


Respiratory: crackles (Left lower lobe.), No lungs clear, No normal breath 

sounds (Decreased at bases.)


Cardiac/Chest: bradycardia, irregularly irregular


Extremities: non-tender, normal inspection


Skin: normal color, warm/dry, No rash


Neuro/Psych: alert, normal mood/affect, oriented x 3





ICD10 Worksheet


Patient Problems: 


 Problems











Problem Status Diagnosed


 


Left lower lobe pneumonia Acute 


 


Palliative care encounter Acute 


 


Syncope Acute 


 


Vomiting Acute 


 


C. difficile diarrhea Acute 


 


Cervical stenosis of spinal canal Acute 


 


GI bleed Acute 


 


Pneumonia Acute

## 2017-01-01 NOTE — CPEKG
Heart Rate: 46

RR Interval: 1304

P-R Interval: 168

QRSD Interval: 88

QT Interval: 460

QTC Interval: 403

P Axis: 32

QRS Axis: -40

T Wave Axis: -44

EKG Severity - ABNORMAL ECG -

EKG Impression: SINUS BRADYCARDIA

EKG Impression: PROBABLE INFERIOR INFARCT, AGE INDETERMINATE

Preliminary Awaiting MD Review

## 2017-01-01 NOTE — HOSPPROG
Hospitalist Progress Note


Assessment/Plan: 


*  Severe sepsis - strep pneumo


   -IV ceftriaxone for 2 weeks


   -await negative f/u BC for PICC placement


*  Pneumonia - no evidence for aspiration


*  UTI - MSSA


   -ceftriaxone will cover


*  Bradycardia - asymptomatic


*  Afib


   -rate controlled without meds


*  CAD/RCA stent


   -ASA, plavix, statin


*  Metabolic encephalopathy


   -improved























Subjective: No new complaints.  No dizzy or lightheaded or chest pain


Objective: 


 Vital Signs











Temp Pulse Resp BP Pulse Ox


 


 36.6 C   47 L  20   97/50 L  96 


 


 01/01/17 11:30  01/01/17 11:30  01/01/17 11:30  01/01/17 11:30  01/01/17 11:30








 Microbiology











 12/29/16 17:30  - Final





 Sputum, Expectorated Sputum Culture - Final


 


 12/28/16 21:14 Urine Culture - Final





 Urine,Clean Catch    Staphylococcus Aureus





    Two [place] Types








 Laboratory Results





 12/31/16 04:24 





 12/31/16 04:24 





 











 12/31/16 01/01/17 01/02/17





 05:59 05:59 05:59


 


Intake Total 400  


 


Output Total 200 550 


 


Balance 200 -550 








EKG viewed, my personal interpretation is:  sinus osman, inferior TWI


tele reviewed:  sinus osman alternating with afib, always slow


CXR:   bilateral patchy pneumonia


ECHO:  normal EF, inferior HK











- Physical Exam


Constitutional: no apparent distress, appears nourished, not in pain


Cardiovascular: regular rate and rhythym, no murmur, rub, or gallop


Respiratory: no respiratory distress, no rales or rhonchi, clear to auscultation


Gastrointestinal: normoactive bowel sounds, soft, non-tender abdomen, no 

palpable masses


Skin: no rashes or abrasions, no fluctuance, no induration


Neurologic: AAOx3, sensation intact bilaterally


Psychiatric: interacting appropriately, not anxious, not encephalopathic, 

thought process linear





ICD10 Worksheet


Patient Problems: 


 Problems











Problem Status Diagnosed


 


Left lower lobe pneumonia Acute 


 


Palliative care encounter Acute 


 


Syncope Acute 


 


Vomiting Acute 


 


C. difficile diarrhea Acute 


 


Cervical stenosis of spinal canal Acute 


 


GI bleed Acute 


 


Pneumonia Acute

## 2017-01-02 NOTE — PCMIDPN
Assessment/Plan: 


Assessment/Plan:





1. Pneumococcal bacteremia in setting of pneumonitis/pneumonia:


- f/u blood cx from 12/31/16 are ngtd


-currently on ceftriaxone. Reviewed current med list. Discussed with 

cardiology. No drug interactions noted with levaquin and cardiology is ok for 

patient to be on quinolone. reviewed side effects with patient. 


-Pt lost his IV access as well.


-Will start levaquin PO today prior to discharge. Pt going to SNF.


-He is on D#3/14. 


-f/u with his primary care physician in in 7-10days.


-care coordinated with hospitalist team. 








Subjective: 


Afebrile. Feels better. still with productive cough. denies sob. not wearing 

o2. denies abd pain. soft stools. appetite intact.


Objective: 


 Vital Signs











Temp Pulse Resp BP Pulse Ox


 


 36.3 C   47 L  16   116/59 L  93 


 


 01/02/17 08:00  01/02/17 08:00  01/02/17 08:00  01/02/17 08:00  01/02/17 08:00








 Laboratory Results





 12/31/16 04:24 





 12/31/16 04:24 





 











 01/01/17 01/02/17 01/03/17





 05:59 05:59 05:59


 


Intake Total  350 


 


Output Total 550 325 


 


Balance -550 25 














- Physical Exam


General Appearance: alert, no apparent distress


EENT: No thrush


Respiratory: coarse breath sounds


Cardiac/Chest: bradycardia


Extremities: No swelling


Abdomen: normal bowel sounds, non-tender, soft, No distended


Skin: No erythema





ICD10 Worksheet


Patient Problems: 


 Problems











Problem Status Diagnosed


 


Left lower lobe pneumonia Acute 


 


Palliative care encounter Acute 


 


Syncope Acute 


 


Vomiting Acute 


 


C. difficile diarrhea Acute 


 


Cervical stenosis of spinal canal Acute 


 


GI bleed Acute 


 


Pneumonia Acute

## 2017-01-02 NOTE — PDIAF
- Diagnosis


Diagnosis: pneumonia with sepsis


Code Status: Full Code





- Medication Management


Discharge Medications: 


 Medications to Continue on Transfer





Atorvastatin Calcium [Lipitor 40 mg (*)] 40 mg PO DAILY 01/04/15 [Last Taken 12/ 27/16]


Clopidogrel Bisulfate [Plavix (*)] 75 mg PO DAILY 01/04/15 [Last Taken 12/27/16]


Tamsulosin HCl [Flomax 0.4 MG (*)] 0.4 mg PO DAILY #30 cap 01/10/15 [Last Taken 

12/27/16]


Pantoprazole Sodium [Protonix 40mg (*)] 40 mg PO DAILY #30 tab 01/17/15 [Last 

Taken 12/28/16]


Aspirin EC [Aspirin EC 81 mg (*)] 81 mg PO DAILY 12/28/16 [Last Taken 12/28/16]


Finasteride [Proscar 5 MG (*)] 5 mg PO DAILY 12/28/16 [Last Taken 12/27/16]


Multivit-Min/FA/Lycopene/Lut [Centrum Silver Ultra Men's Tab] 1 each PO DAILY 12 /28/16 [Last Taken 12/28/16]


levOFLOXACIN [levAQUIN (*)] 750 mg PO DAILY AT 10AM #0 tab 01/02/17 [Last Taken 

Unknown]








Long Term Antibiotic Stop Date: 01/13/17 (Levaquin until this date)


Discharge Medications: Refer to the Discharge Home Medication list for PRN 

reason.





- Orders


Services needed: Physical Therapy, Occupational Therapy


Diet Texture: Regular Texture Diet, Thin Liquids, Meds Whole w/Liquids





- Follow Up Care


Current Providers and Referrals: 


[place],. [Primary Care Provider] -

## 2017-01-02 NOTE — GDS
[f rep st]



                                                             DISCHARGE SUMMARY





DISCHARGE DIAGNOSES:  

1.  Severe sepsis due to strep pneumo.

2.  Pneumonia.

3.  Methicillin-sensitive Staphylococcus aureus urinary tract infection.

4.  Asymptomatic bradycardia.

5.  Atrial fibrillation.

6.  Coronary artery disease, status post previous right coronary artery stent.

7.  Metabolic encephalopathy.



HISTORY:  The patient is an 87-year-old male who presented when he became very confused.  He was foun
d to have severe sepsis, with strep pneumo growing from blood cultures.  He was found to have pneumon
ia as the source of his infection.  Infectious Disease saw him here in consultation.  During hospital
ization he was treated with IV ceftriaxone, but the organism was pansensitive and he is being dischar
ged on oral Levaquin.  This will also cover the MSSA that grew in his urine. 



He was also seen here in consultation by Cardiology.  He is significantly bradycardic, with heart rat
es in the 40s; however, that is felt by Cardiology to be asymptomatic.  He was monitored on telemetry
 throughout his hospitalization, and had no further presyncope symptoms once his sepsis was treated. 
 He has chronic atrial fibrillation which is rate controlled without any medications.  He has a previ
ous right coronary artery stent, and remains on aspirin, Plavix, and statin, which Cardiology recomme
nded to be continued.  He does have a history of falls currently, and full anticoagulation was not re
commended by them.



DISCHARGE MEDICATIONS:  Please see computer record for full detailed list.  



New medications:  Levaquin 750 mg p.o. daily.



ADDITIONAL DISCHARGE INSTRUCTIONS:  

1.  Continue Levaquin through January 13, 2017.

2.  PT/OT to be delivered at skilled nursing facility upon transfer. 



Greater than 30 minutes time was spent arranging discharge.  Patient seen and examined by me on day o
f discharge.





Job #:  [id number]

## 2018-04-12 ENCOUNTER — HOSPITAL ENCOUNTER (OUTPATIENT)
Dept: HOSPITAL 80 - FED | Age: 83
Setting detail: OBSERVATION
LOS: 1 days | Discharge: HOME HEALTH | End: 2018-04-13
Attending: INTERNAL MEDICINE | Admitting: INTERNAL MEDICINE
Payer: COMMERCIAL

## 2018-04-12 DIAGNOSIS — Z91.81: ICD-10-CM

## 2018-04-12 DIAGNOSIS — I50.32: ICD-10-CM

## 2018-04-12 DIAGNOSIS — Z95.5: ICD-10-CM

## 2018-04-12 DIAGNOSIS — R05: ICD-10-CM
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
## 2018-04-12 DIAGNOSIS — I25.10: ICD-10-CM

## 2018-04-12 DIAGNOSIS — N40.0: ICD-10-CM

## 2018-04-12 DIAGNOSIS — I48.91: ICD-10-CM

## 2018-04-12 DIAGNOSIS — R29.6: ICD-10-CM

## 2018-04-12 DIAGNOSIS — S01.112A: Primary | ICD-10-CM
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
## 2018-04-12 DIAGNOSIS — E86.0: ICD-10-CM

## 2018-04-12 DIAGNOSIS — G21.9: ICD-10-CM

## 2018-04-12 LAB
INR PPP: 0.97 (ref 0.83–1.16)
PLATELET # BLD: 200 10^3/UL (ref 150–400)
PROTHROMBIN TIME: 13.1 SEC (ref 12–15)

## 2018-04-12 PROCEDURE — 71045 X-RAY EXAM CHEST 1 VIEW: CPT

## 2018-04-12 PROCEDURE — 93005 ELECTROCARDIOGRAM TRACING: CPT

## 2018-04-12 PROCEDURE — 99285 EMERGENCY DEPT VISIT HI MDM: CPT

## 2018-04-12 PROCEDURE — G0378 HOSPITAL OBSERVATION PER HR: HCPCS

## 2018-04-12 PROCEDURE — 08QPXZZ REPAIR LEFT UPPER EYELID, EXTERNAL APPROACH: ICD-10-PCS | Performed by: EMERGENCY MEDICINE

## 2018-04-12 PROCEDURE — 70450 CT HEAD/BRAIN W/O DYE: CPT

## 2018-04-12 PROCEDURE — G0480 DRUG TEST DEF 1-7 CLASSES: HCPCS

## 2018-04-12 PROCEDURE — 97161 PT EVAL LOW COMPLEX 20 MIN: CPT

## 2018-04-12 PROCEDURE — 12011 RPR F/E/E/N/L/M 2.5 CM/<: CPT

## 2018-04-12 PROCEDURE — 96360 HYDRATION IV INFUSION INIT: CPT

## 2018-04-12 PROCEDURE — 92523 SPEECH SOUND LANG COMPREHEN: CPT

## 2018-04-12 NOTE — EDPHY
H & P


Time Seen by Provider: 04/12/18 13:26


HPI/ROS: 





CHIEF COMPLAINT:  Eyebrow laceration





HISTORY OF PRESENT ILLNESS:  The patient is an 89-year-old man who comes to the 

emergency department via EMS with a laceration to his left eyebrow.  He felt 

lightheaded when standing up out of his car and fell in his driveway.  He 

reports that he has had a mild cold for the last few days and that overall he 

is feeling better but still feels slightly lightheaded when he stands up.  He 

drove himself to the coffee shop this morning felt lightheaded there and had to 

be helped to his car then he drove home.  After getting up out of his car in 

his driveway he fell.  He denies loss of consciousness.  He denies neck pain.  

He denies any injuries to his extremities.  He denies vertigo but does state 

that he feels lightheaded if he stands up.  He has a history of coronary artery 

disease with 1 stent.   He denies chest pain or shortness of breath today.  He 

denies headache.








REVIEW OF SYSTEMS:


Constitutional:  denies: chills, fever, recent illness, recent injury


EENTM: denies: blurred vision, double vision, nose congestion


Respiratory: denies: cough, shortness of breath


Cardiac:  See HPI denies: chest pain, irregular heart rate, palpitations


Gastrointestinal/Abdominal: denies: abdominal pain, diarrhea, nausea, vomiting, 

blood streaked stools


Genitourinary: denies: dysuria, frequency, hematuria, pain


Musculoskeletal: denies: joint pain, muscle pain


Skin:  See HPI denies: lesions, rash, jaundice, bruising


Neurological: denies: headache, numbness, paresthesia, tingling, dizziness, 

weakness


Hematologic/Lymphatic: denies: blood clots, easy bleeding, easy bruising


Immunologic/allergic: denies: HIV/AIDS, transplant








EXAM:


GENERAL:  Well-appearing, well-nourished and in no acute distress.


HEAD:  Laceration 2 cm in left eyebrow.


EYES:  Pupils equal round and reactive to light, extraocular movements intact, 

sclera anicteric, conjunctiva are normal.


ENT:  TMs normal, nares patent, oropharynx clear without exudates.  Moist 

mucous membranes.


NECK:  Normal range of motion, supple without lymphadenopathy or JVD.


LUNGS:  Breath sounds clear to auscultation bilaterally and equal.  No wheezes 

rales or rhonchi.


HEART:  Regular rate and rhythm without murmurs, rubs or gallops.


ABDOMEN:  Soft, nontender, normoactive bowel sounds.  No guarding, no rebound.  

No masses appreciated.


BACK:  No CVA tenderness, no spinal tenderness, step-offs or deformities


EXTREMITIES:  Normal range of motion, no pitting or edema.  No clubbing or 

cyanosis.


NEUROLOGICAL:  Cranial nerves II through XII grossly intact.  Normal speech, 

normal gait.  5/5 strength, normal movement in all extremities, normal sensation


PSYCH:  Normal mood, normal affect.


SKIN:  Warm, dry, normal turgor, no visible rashes or lesions.








Source: Patient, EMS


Exam Limitations: No limitations





- Personal History


Tetanus Vaccine Date: within 10 years





- Medical/Surgical History


Hx Asthma: No


Hx Chronic Respiratory Disease: No


Hx Diabetes: No


Hx Cardiac Disease: Yes


Hx Renal Disease: No


Hx Cirrhosis: No


Hx Alcoholism: No


Hx HIV/AIDS: No


Hx Splenectomy or Spleen Trauma: No


Other PMH: MI W/ 1 STENT PLACED, ATRIAL FIB, BROKEN L ANKLE.  'PNEUMONIA, R hip 

replacement, stroke (2012),





- Family History


Significant Family History: No pertinent family hx





- Social History


Smoking Status: Never smoked


Constitutional: 


 Initial Vital Signs











Heart Rate  68   04/12/18 14:50


 


Respiratory Rate  19   04/12/18 14:50


 


Blood Pressure  151/77 H  04/12/18 14:50


 


O2 Sat (%)  94   04/12/18 14:50








 











O2 Delivery Mode               Room Air














Allergies/Adverse Reactions: 


 





No Known Allergies Allergy (Unverified 01/14/15 09:19)


 








Home Medications: 














 Medication  Instructions  Recorded


 


Atorvastatin Calcium [Lipitor 40 40 mg PO HS 01/04/15





mg (*)]  


 


Pantoprazole Sodium [Protonix 40mg 40 mg PO DAILY #30 tab 01/17/15





(*)]  


 


Aspirin EC [Aspirin EC 81 mg (*)] 81 mg PO DAILY 12/28/16


 


Finasteride [Proscar 5 MG (*)] 5 mg PO DAILY 12/28/16


 


Multivitamins [Multivitamin (*)] 1 each PO DAILY 04/12/18


 


Tamsulosin HCl [Flomax 0.4 MG (*)] 0.4 mg PO HS 04/12/18














Medical Decision Making





- Diagnostics


EKG Interpretation: 





An EKG obtained and was read and documented in trace view.  Please see trace 

view for full reading and report.  Sinus rhythm, no acute ischemic changes, 

inferior abnormality similar to previous 


Imaging Results: 


 Imaging Impressions





Head CT  04/12/18 13:33


Impression:


1. No acute intracranial findings.


2. Diffuse cerebral atrophy with periventricular and subcortical low 

attenuation consistent with chronic microvascular ischemic gliosis.


 


Findings discussed with Dr. Jose Salazar on April 12, 2018 at 1402 hours. 


 











Procedures: 





Procedure:  Laceration repair.





Verbal consent was obtained from the patient.  The 2 cm left eyebrow laceration 

was  anesthetized with 1% lidocaine with epi and bicarbonate locally 

infiltrated.  The wound was irrigated copiously according to protocol, draped 

and explored to its base.  It was approximately 1/2 cm deep.  There were no 

deep structures involved.  No tendon, nerve, or vascular injury was identified 

when explored through full range of motion.  No foreign body was identified.  

The wound was repaired with 6.0 Prolene, 3 stitches, interrupted.  The wound 

repair was simple without wound margin revisement or multiple flap alignment.  

The procedure was performed by myself.  A dressing was then placed with sterile 

gauze and bacitracin.


ED Course/Re-evaluation: 


According to the medical record the patient has a history of atrial 

fibrillation.  The patient states that he is not aware of this.  He is also not 

sure of his medications.  The record has Plavix listed but no Coumadin or 

warfarin.





Head CT ordered in this adult patient for trauma for the following indication:  

Age greater than 65 on blood thinners





The patient does not wish To stay in the hospital and states that he if he was 

not feeling sick with a cold he would not have fallen.





3:50 p.m. the patient is unable to ambulate without significant assistance.  He 

has an unsteady gait.  I do not feel comfortable letting him go home alone.  We 

are unable to contact his family.  I spoke with [facility] and they would prefer to 

have him stay Here.  I will admit him to the hospital service.





4:00 p.m. I spoke with Dr. Khan who will accept.


Differential Diagnosis: 





Partial list of the Differential diagnosis considered include but were not 

limited to;  laceration, dehydration, syncope, arrhythmia and although unlikely 

based on the history and physical exam, I also considered fracture, neck injury

, intracranial hemorrhage. 





- Data Points


Laboratory Results: 


 Laboratory Results





 04/12/18 13:24 





 04/12/18 13:24 





 











  04/12/18 04/12/18 04/12/18





  13:24 13:24 13:24


 


WBC      6.51 10^3/uL 10^3/uL





     (3.80-9.50) 


 


RBC      4.40 10^6/uL 10^6/uL





     (4.40-6.38) 


 


Hgb      13.4 g/dL L g/dL





     (13.7-17.5) 


 


Hct      39.6 % L %





     (40.0-51.0) 


 


MCV      90.0 fL fL





     (81.5-99.8) 


 


MCH      30.5 pg pg





     (27.9-34.1) 


 


MCHC      33.8 g/dL g/dL





     (32.4-36.7) 


 


RDW      15.2 % %





     (11.5-15.2) 


 


Plt Count      200 10^3/uL 10^3/uL





     (150-400) 


 


MPV      10.6 fL fL





     (8.7-11.7) 


 


Neut % (Auto)      65.8 % %





     (39.3-74.2) 


 


Lymph % (Auto)      14.6 % L %





     (15.0-45.0) 


 


Mono % (Auto)      17.7 % H %





     (4.5-13.0) 


 


Eos % (Auto)      0.3 % L %





     (0.6-7.6) 


 


Baso % (Auto)      0.8 % %





     (0.3-1.7) 


 


Nucleat RBC Rel Count      0.0 % %





     (0.0-0.2) 


 


Absolute Neuts (auto)      4.29 10^3/uL 10^3/uL





     (1.70-6.50) 


 


Absolute Lymphs (auto)      0.95 10^3/uL L 10^3/uL





     (1.00-3.00) 


 


Absolute Monos (auto)      1.15 10^3/uL H 10^3/uL





     (0.30-0.80) 


 


Absolute Eos (auto)      0.02 10^3/uL L 10^3/uL





     (0.03-0.40) 


 


Absolute Basos (auto)      0.05 10^3/uL 10^3/uL





     (0.02-0.10) 


 


Absolute Nucleated RBC      0.00 10^3/uL 10^3/uL





     (0-0.01) 


 


Immature Gran %      0.8 % %





     (0.0-1.1) 


 


Immature Gran #      0.05 10^3/uL 10^3/uL





     (0.00-0.10) 


 


PT    13.1 SEC SEC  





    (12.0-15.0)  


 


INR    0.97   





    (0.83-1.16)  


 


APTT    29.0 SEC SEC  





    (23.0-38.0)  


 


Sodium  142 mEq/L mEq/L    





   (135-145)   


 


Potassium  4.3 mEq/L mEq/L    





   (3.5-5.2)   


 


Chloride  103 mEq/L mEq/L    





   ()   


 


Carbon Dioxide  26 mEq/l mEq/l    





   (22-31)   


 


Anion Gap  13 mEq/L mEq/L    





   (8-16)   


 


BUN  26 mg/dL H mg/dL    





   (7-23)   


 


Creatinine  1.2 mg/dL mg/dL    





   (0.7-1.3)   


 


Estimated GFR  57     





    


 


Glucose  80 mg/dL mg/dL    





   ()   


 


Calcium  9.3 mg/dL mg/dL    





   (8.5-10.4)   


 


Troponin I  0.021 ng/mL ng/mL    





   (0.000-0.034)   


 


Ethyl Alcohol  < 10 mg/dL mg/dL    





   (0-10)   











Medications Given: 


 








Discontinued Medications





Sodium Chloride (Ns)  1,000 mls @ 0 mls/hr IV EDNOW ONE; Wide [facility]


   PRN Reason: Protocol


   Stop: 04/12/18 13:34


   Last Admin: 04/12/18 13:35 Dose:  1,000 mls








Departure





- Departure


Disposition: [facility]


Clinical Impression: 


 Unsteady gait





Laceration of eyebrow, left


Qualifiers:


 Encounter type: initial encounter Qualified Code(s): S01.112A - Laceration 

without foreign body of left eyelid and periocular area, initial encounter





Condition: Fair

## 2018-04-12 NOTE — CPEKG
Heart Rate: 60

RR Interval: 1000

P-R Interval: 176

QRSD Interval: 92

QT Interval: 391

QTC Interval: 391

P Axis: 20

QRS Axis: -48

T Wave Axis: 30

EKG Severity - ABNORMAL ECG -

EKG Impression: SINUS RHYTHM

EKG Impression: PROBABLE INFERIOR INFARCT, AGE INDETERMINATE

EKG Impression: CONSIDER POSTERIOR INFARCT

EKG Impression: Similar to previous

Electronically Signed By: Jose Salazar 12-Apr-2018 13:46:36

## 2018-04-12 NOTE — ASMTCMCOM
CM Note

 

CM Note                       

Notes:

Patient arrived to ER via EMS after falling in his driveway and sustaining a laceration on his left 


eyebrow.  Patient reports that he felt lightheaded because he "has a cold", and that is why he 

fell. Although he is oriented to time and place, he is a rather poor historian as it relates to his 


medical history and current medications. Patient is somewhat unsteady with his gait as well.



Patient tells me that he lives independently in a ranch home in [place].  He has been  

for 40+ years and has a brother and SHANNAN in [place].  I have LM with them ([facility] 

[facility]) @ [phone] hoping to get a baseline of patient's physical/functional status.  I have 

called [facility] and faxed an ER report to patients PCP; Dr. Violette Sanchez.  Dr. Rozinski has 

contacted [facility] as well and patient will be admitted for observation



CM will follow as needed

 

Date Signed:  04/12/2018 04:35 PM

Electronically Signed By:Marlene Stuart RN

## 2018-04-12 NOTE — GHP
[f 
rep st]



                                                            HISTORY AND PHYSICAL





DATE OF ADMISSION:  04/12/2018



CHIEF COMPLAINT:  Fall, head laceration.



HISTORY OF PRESENT ILLNESS:  An 89-year-old male, history of atrial fibrillation
, coronary disease, diastolic heart failure, who comes to the ER via EMS after 
a fall.  He was at a coffee shop today and felt dizzy and had help to his car 
and then drove home.  After getting out of his car he fell in his driveway.  He 
denies loss of consciousness, and denies any prodromal symptoms including chest 
pain, palpitations, sweaty, nausea, vomiting.  Says his legs gave out 
underneath him.  Denies chest pain when he walks with his walker. 



Has had a cold for the last 3 or 4 days including nonproductive cough, sneezing
, runny nose.  No myalgias.  He does not drink much water.  He has actually 
been drinking NOS energy drinks, which he says last 2-3 days.  Does drink some 
juice.  No ill contacts.  No fevers.  



He had a massive fall 4-5 years ago, in which he fell down the stairs in the 
basement and was there for 5 days.  Since that time, he has had greater than 10 
falls, last being 2 months ago.



REVIEW OF SYSTEMS:  I completed a 10-point review of systems, negative except 
noted in HPI.



PAST MEDICAL HISTORY:  

1.  Coronary artery disease with 1 stent, atrial fibrillation.

2.  History of GI bleed, duodenal ulcer.

3.  History of C difficile. 

4.  Diastolic heart failure.

5.  GERD.

6.  BPH. 

7.  Falls.



PAST SURGICAL HISTORY:  Hip surgery, ankle surgery.



SOCIAL HISTORY:  Lives in [place] alone.  Has family in [place], [place].  
Uses a walker at home and a cane when he is out.  Denies alcohol, tobacco or 
illicits.



HOME MEDICATIONS:  Multivitamin, Flomax, finasteride, Lipitor 40 mg daily, 81 
mg aspirin, Protonix 40 mg daily.



ALLERGIES:  None.



FAMILY HISTORY:  Noncontributory.



PHYSICAL EXAM:  VITAL SIGNS:  Temperature 37, blood pressure 155/75, heart rate 
71, respirations 16, 97% on room air.  GENERAL:  Elderly male in bed, no acute 
distress.  HEENT:  PERRLA.  EOMI.  Oropharynx clear.  Laceration above left eye 
is bandaged.  CV:  Regularly irregular.  LUNGS:  Diminished but clear.  No 
wheezing.  ABDOMEN:  Soft, nontender, nondistended.  Positive bowel sounds.  
:  No Hickey.  MUSCULOSKELETAL:  He is moving all 4 extremities.  NEURO:  2 
through 12 intact.  PSYCH:  Alert and oriented x3.



DIAGNOSTIC STUDIES:  Sodium 142, potassium 4.3, chloride 103, carbon dioxide 26
, BUN is 26, creatinine is 1.2, baseline is 0.9, glucose 80.  Troponin 0.21.  



Coags within normal.  Blood alcohol is less than 10.  



WBC 6, hemoglobin 13, hematocrit 39, platelets 200. 



Head CT:  No acute intracranial findings.  Diffuse cerebral atrophy with 
periventricular and subcortical low attenuation. 



EKG is personally reviewed by me.  Sinus arrhythmia, ST flattening V5 through 
V6.  This is similar to prior. 



Echocardiogram 12/2016, EF 50% to 55%.  LV basal and mid inferior walls are 
akinetic.  All remaining segments have normal motion.  LA is moderately to 
severely dilated.  Portal, __________ without obstruction.  Moderate MR.  Mild 
valvular aortic stenosis.  PG is 331 mmHg.



ASSESSMENT AND PLAN:  

1.  Acute falls:  suffered head laceration today. Many falls in the last couple 
years. Today, likely from dehydration based on hx, elevated Cr. Advised him to 
stop drinking energy drinks.  He denies any prodromal symptoms.  He does have 
evidence of sinus arrhythmia on EKG.  Last echo showed mild aortic stenosis. 
Monitor on telemetry.  Suspect dizziness is due to dehydration with creatinine 
elevated and him endorsing recent cold and decreased oral intake.  Will check 
orthostatics.  Monitor on telemetry.  Can consider echocardiogram here.  This 
could be done as an outpatient. Hold Proscar and finasteride.

2.  Coronary artery disease.  Denies chest pain.  Troponin negative.

3.  History of atrial fibrillation.  He is on a baby aspirin.

4.  History of a duodenal ulcer.  Denies any acute bleeding.  

5.  Diastolic heart failure.  No evidence of volume overload.

6.  Diet:  Regular.

7.  Deep venous thrombosis prophylaxis:  sequential compression devices.

8.  Goals:  Discussed cor status with patient and he wishes to be full cor at 
this time.  I expressed that I would like Physical Therapy/Occupational Therapy 
to work with him, but he says he is going to leave early in the morning.  I 
recommended against this.

9.  Disposition:  Patient warrants observation admission given acute falls 
requiring telemetry, physical therapy/occupational therapy.





Job #:  [id number]

MTDD

## 2018-04-12 NOTE — ASMTCMCOM
CM Note

 

CM Note                       

Notes:

CM received call back from patient's SHANNAN, Haydee [phone].  Haydee confirms that patient has 

had been having falls over the past couple of years and that the patient has had HH services in the 


past, but "usually dismisses them" quickly.  Patient has a house keeper that comes in once every 

other week, but no other services at this time. Haydee confirms that patient does live in a ranch 

home, but his laundry is in the basement.  Haydee and Lester are patient's closest relatives.  He has 

a sister, Samira, who lives in [place]



I have informed Haydee of patient's admission and room status.  They will be in to visit patient 

this evening and are available for transportation when patient is discharged, as well as 

support/encouragment with D/C planning.

 

Date Signed:  04/12/2018 04:44 PM

Electronically Signed By:Marlene Stuart RN

## 2018-04-13 ENCOUNTER — HOSPITAL ENCOUNTER (OUTPATIENT)
Dept: HOSPITAL 80 - FED | Age: 83
Setting detail: OBSERVATION
LOS: 1 days | Discharge: NURSING, REHAB OR LONG-TERM CARE | End: 2018-04-14
Attending: INTERNAL MEDICINE | Admitting: INTERNAL MEDICINE
Payer: COMMERCIAL

## 2018-04-13 VITALS — SYSTOLIC BLOOD PRESSURE: 138 MMHG | DIASTOLIC BLOOD PRESSURE: 60 MMHG

## 2018-04-13 DIAGNOSIS — J10.89: Primary | ICD-10-CM

## 2018-04-13 DIAGNOSIS — Z91.81: ICD-10-CM

## 2018-04-13 DIAGNOSIS — N40.0: ICD-10-CM

## 2018-04-13 DIAGNOSIS — R29.6: ICD-10-CM

## 2018-04-13 LAB — PLATELET # BLD: 160 10^3/UL (ref 150–400)

## 2018-04-13 PROCEDURE — 99285 EMERGENCY DEPT VISIT HI MDM: CPT

## 2018-04-13 PROCEDURE — G0378 HOSPITAL OBSERVATION PER HR: HCPCS

## 2018-04-13 PROCEDURE — 97166 OT EVAL MOD COMPLEX 45 MIN: CPT

## 2018-04-13 PROCEDURE — 97161 PT EVAL LOW COMPLEX 20 MIN: CPT

## 2018-04-13 PROCEDURE — 71045 X-RAY EXAM CHEST 1 VIEW: CPT

## 2018-04-13 RX ADMIN — OSELTAMIVIR PHOSPHATE SCH MG: 6 POWDER, FOR SUSPENSION ORAL at 19:18

## 2018-04-13 NOTE — GDS
[f rep st]



                                                             DISCHARGE SUMMARY





DISCHARGE DIAGNOSES:  

1.  Status post mechanical fall with head laceration.

2.  Coronary artery disease.

3.  Atrial fibrillation.

4.  Duodenal ulcer history.

5.  History of Clostridium difficile colitis.

6.  Chronic diastolic heart failure.

7.  Gastroesophageal reflux disease.

8.  Benign prostatic hypertrophy.

9.  Chronic gait instability with frequent falls.



HISTORY OF PRESENT ILLNESS:  An 89-year-old male who currently lives independently, who presents afte
r a fall and a head laceration.  The fall occurred at a coffee shop.  For details of patient's initia
l presentation, please see the history and physical, dated 04/12/2018.



CONSULTATIVE SERVICES:  None.



PROCEDURES:  On 04/12/2018, patient had a PA lateral chest x-ray that showed no infiltrates or edema.




HOSPITAL COURSE BY ISSUE:  

1.  Mechanical fall with head laceration.  The patient was sutured in the emergency department and se
en by PT, OT on the floor.  The patient was felt to be a high fall risk and a good candidate for skil
led nursing placement, as his carry-over for instruction and healthy ambulating behavior is quite low
.  We discussed this with the patient and he is adamantly refusing placement to a skilled nursing fac
ility and was reluctant to even agree to home therapy.  After much discussion with myself, the nurse,
 and the therapist, he is amenable to therapy at home for a short period of time, but will absolutely
 not agree to any additional resources.  He is insistent on leaving this morning from the hospital, e
zoran though the additional time could potentially allow us to set up more sources for him in the home.


2.  Nonproductive cough.  The patient had a chest x-ray which was negative.  The morning of dispositi
on, he is reporting improvement in the symptom.



MEDICATIONS:  At the time of disposition, please reference med rec.  Patient is continued on his home
 medications without change.



PENDING STUDIES:  At the time of this dictation are none.



FOLLOWUP APPOINTMENTS:  Include with his primary care provider in the next 1-2 weeks for post disposi
tion followup.  The patient is being discharged with a home RN for blood pressure checks and home PT 
for ongoing strengthening and support.  It should be noted that we are not in agreement with this pat
ient's choice for disposition, but he has decisional capacity and is opting for the less safe option 
at this time, and in fact insisting that it happen in a time course quite rapid this morning.  



I spent greater than 30 minutes in the planning and coordination of this discharge.





Job #:  [id number]

## 2018-04-13 NOTE — ASDISCHSUM
----------------------------------------------

Discharge Information

----------------------------------------------

Plan Status:Home with Home Health                    Medically Cleared to Leave:

Discharge Date:04/13/2018 11:14 AM                    D/C Disposition:Home Health Service

ADT D/C Disposition:Home, Routine, Self-Care         Projected Discharge Date:04/14/2018 11:00 AM

Transportation at D/C:Family                         Discharge Delay Reason:

Follow-Up Date:04/14/2018 11:00 AM                   Discharge Slot:

Final Diagnosis:

----------------------------------------------

Placement Information

----------------------------------------------

Referral Type:*Nursing Home/SNF                      Referral ID:[id number]

Provider Name:

Address 1:                                           Phone Number:

Address 2:                                           Fax Number:

City:                                                Selection Factors:

State:

 

----------------------------------------------

Patient Contact Information

----------------------------------------------

Contact Name:MARYLANEJOHANN                          Relationship:Other

Address:[address]                Home [phone]

                                                     Work [phone]

City:                                                Alternate Phone:

[facility] Code:                                      Email:

----------------------------------------------

Financial Information

----------------------------------------------

Financial Class:Medicare Advantage Plans

Primary Plan Desc:[employer] MED[employer] ADVANTAGE OUTPAT      Primary Plan Number:[id number]

Secondary Plan Desc:                                 Secondary Plan Number:

 

 

----------------------------------------------

Assessment Information

----------------------------------------------

----------------------------------------------

[facility] CM Progress Note

----------------------------------------------

CM Note

 

CM Note                       

Notes:

Patient arrived to ER via EMS after falling in his driveway and sustaining a laceration on his left 


eyebrow.  Patient reports that he felt lightheaded because he "has a cold", and that is why he 

fell. Although he is oriented to time and place, he is a rather poor historian as it relates to his 


medical history and current medications. Patient is somewhat unsteady with his gait as well.



Patient tells me that he lives independently in a ranch home in [place].  He has been  

for 40+ years and has a brother and SHANNAN in [place].  I have LM with them (Pete) @ [phone] hoping to get a baseline of patient's physical/functional status.  I have 

called [facility] and faxed an ER report to patients PCP; Dr. Violette Sanchez.  Dr. Rozinski has 

contacted [facility] as well and patient will be admitted for observation



CM will follow as needed

 

Date Signed:  04/12/2018 04:35 PM

Electronically Signed By:Marlene Stuart RN

 

 

----------------------------------------------

[facility] Progress Note

----------------------------------------------

CM Note

 

CM Note                       

Notes:

CM received call back from patient's SHANNAN, Haydee [phone].  Haydee confirms that patient has 

had been having falls over the past couple of years and that the patient has had HH services in the 


past, but "usually dismisses them" quickly.  Patient has a house keeper that comes in once every 

other week, but no other services at this time. Haydee confirms that patient does live in a ranch 

home, but his laundry is in the basement.  Haydee and Luke are patient's closest relatives.  He has 

a sister, Samira, who lives in [place]



I have informed Haydee of patient's admission and room status.  They will be in to visit patient 

this evening and are available for transportation when patient is discharged, as well as 

support/encouragment with D/C planning.

 

Date Signed:  04/12/2018 04:44 PM

Electronically Signed By:Marlene Stuart RN

 

 

----------------------------------------------

[facility] CM Progress Note

----------------------------------------------

CM Note

 

CM Note                       

Notes:

Spoke w/MD, pt refusing SNF, wants to go home. Agreed to homecare but pt left befor CM could set 

up. RN then notified TIANNA that pt's Sister in law called her that pt was coming back to the hospital 

because she could not get him in the house, pt just sat on front step. CM notified MD, he is now in 


the ER, ED CM notified.

 

Date Signed:  04/13/2018 01:48 PM

Electronically Signed By:Sally Zheng RN

 

 

----------------------------------------------

Intervention Information

----------------------------------------------

## 2018-04-13 NOTE — PDIAF
- Diagnosis


Diagnosis: mechanical fall


Code Status: Full Code





- Medication Management


Discharge Medications: 


 Medications to Continue on Transfer





Atorvastatin Calcium [Lipitor 40 mg (*)] 40 mg PO HS 01/04/15 [Last Taken 04/11/ 18]


Pantoprazole Sodium [Protonix 40mg (*)] 40 mg PO DAILY #30 tab 01/17/15 [Last 

Taken 04/12/18]


Aspirin EC [Aspirin EC 81 mg (*)] 81 mg PO DAILY 12/28/16 [Last Taken 04/12/18]


Finasteride [Proscar 5 MG (*)] 5 mg PO DAILY 12/28/16 [Last Taken 04/12/18]


Multivitamins [Multivitamin (*)] 1 each PO DAILY 04/12/18 [Last Taken 04/12/18]


Tamsulosin HCl [Flomax 0.4 MG (*)] 0.4 mg PO HS 04/12/18 [Last Taken 04/11/18]








Discharge Medications: Refer to the Discharge Home Medication list for PRN 

reason.





- Orders


Services needed: Home Care, Registered Nurse, Physical Therapy


Home Care Face to Face: I certify that this patient was under my care and that 

I had the required face-to-face encounter meeting the encounter requirements on 

the discharge day.  My findings support the fact that the patient is homebound 

as defined in


Home Care Face to Face Continued: CMS Chapter 7 Medicare Benefits Manual 30.1.1

, The condition of the patient is such that there exists a normal inability to 

leave home and consequently, leaving home would require a considerable and 

taxing effort.


Isolation Type: None


Diet Recommendation: no restrictions on diet


Diet Texture: Regular Texture Diet


Additional Instructions: 


Have your stitches removed in 5 days


please follow with your PCP in the next 2-4 weeks for post discharge follow up





- Follow Up Care


Current Providers and Referrals: 


Patient,NotPresent [Unknown] - As per Instructions

## 2018-04-13 NOTE — GHP
[f rep st]



                                                            HISTORY AND PHYSICAL





DATE OF ADMISSION:  04/13/2018



CHIEF COMPLAINT:  Weakness, fall.



HISTORY OF PRESENT ILLNESS:  An 89-year-old male who was hospitalized on 04/12/2018 with complaints o
f mechanical fall.  The patient was worked up upon admission for possible pulmonary infection and rev
ersible causes of gait instability. Patient was adamant on leaving the hospital against medical recom
mendations for skilled nursing to return home.  We were able to convince him to use home PT, but noth
ing more.  The patient refused any concerning symptoms on the morning of admission and was discharged
 on his home medications with home health.  Patient reports being driven home by a family member. Whe
n getting to the one step he needed to rise in his garage, feeling weak and falling.  The patient is 
returning to the emergency department by family member for evaluation.  In the emergency room now, he
 reports feeling weak and is now more agreeable to the idea of placement for strengthening and rehabi
litation.  He reports that his cough, previously reported as dry, is more bothersome to him now that 
he has returned to the hospital.  Denies any subjective fevers or chills. Denies any palpitations.  D
enies any chest pain. Denies any shortness of breath.  Denies any nausea or vomiting.  Denies any teresa
lgias, arthralgias, or new rashes.  Denies any rhinorrhea.



PAST MEDICAL HISTORY:  

1.  Multiple falls and gait instability.

2.  Coronary artery disease.

3.  Gastroesophageal reflux disease.

4.  Duodenal ulcer.

5.  History of C. difficile colitis.

6.  Chronic diastolic heart failure.

7.  BPH.



SOCIAL HISTORY:  Patient lives alone.  He uses a walker at home, not outside.  Denies alcohol tobacco
, or illicit drugs.



FAMILY HISTORY:  Negative for heart disease.



REVIEW OF SYSTEMS:  A 10-point review of systems is negative with the exception of that reported in t
he HPI.



ADVANCED DIRECTIVES:  The patient wishes to be full cor, full tube.



PHYSICAL EXAMINATION:  VITAL SIGNS:  Blood pressure 115/68, heart rate 68, respiratory rate 16 satura
ting 95% on room air,37.0. GENERAL: In general, this is an elderly male with a laceration above his l
eft brow, status post suturing.  HEENT:  Notable for moist mucous membranes.  Eye exam is negative fo
r any icterus. CARDIAC: Patient is regular rate and rhythm.  A systolic murmur is appreciated. PULMON
TARA: No rales or rhonchi are appreciated. GASTROINTESTINAL: Positive bowel sounds.  ABDOMEN:  Soft an
d nontender. MUSCULOSKELETAL: Negative for any lower extremity edema. SKIN: Exam is negative for any 
rashes. NEUROLOGIC: He is alert and oriented x3. PSYCHIATRIC: He appears depressed on my examination.




DATA:  White count 8.7, hematocrit 37.7, platelets of 160.  Creatinine 1.0, down from admission value
s.  Sodium 141. 



Telemetry, which I personally reviewed and interpreted, shows sinus rhythm with no acute changes.



ASSESSMENT AND PLAN:  This is an 89-year-old male, re-presenting with recurrent fall after refusing a
ppropriate disposition.

1.  Recurrent falls.  Patient is now amenable to skilled nursing placement.  I do believe he is dange
giovanna to return home even if he were able to successfully get inside.  We re-discussed this with the p
ash.  He understands now there are not additional safe disposition options for him. Asked Case Man
iker to review the case and work on finding safer placement for this patient.

2.  Cough.  Patient continues to report cough.  He has no fever or leukocytosis.  Will, however, chec
k a chest x-ray.

3.  Coronary artery disease.  Can continue his home medications without change.

4.  Benign prostatic hypertrophy. Patient's blood pressures have been adequately controlled.  Will co
ntinue his home medications and monitor in the inpatient setting.

5.  Prophylaxis with Lovenox.

6.  Diet, regular.

7.  Disposition. I am expecting greater than 2 midnights as the patient has had recurrent falls and w
ill need therapy and Case Management evaluations for safe disposition.  



I have discussed the case with the emergency room physician as well as Case Management.  We will admi
t the patient and assist in appropriate safe discharge to skilled nursing.





Job #:  [id number]

## 2018-04-13 NOTE — PDMN
Medical Necessity


Medical necessity: C/M review:  Patient meets INPT criteria under Neurology GRG 

(Generalized muscle weakness):  Acute and persistent generalized weakness, 

recurrent falls, cough,  it is dangerous for patient to return home even if 

patient could get inside his home, patient is high risk for further falls, 

ongoing gait instability, generalized weakness and requires hospital admission 

for ongoing medical necessity, requiring IV Levaquin and IV fluids in ED, Case 

management consult, ongoing acute inpt PT/OT, comorbid 4/12/2018-4/13/2018 

observation hospitalization for a mechanical fall and head laceration, patient 

with possible pulmonary infection and reversible causes of gait instability, , 

patient adamant on leaving the hospital against medical recommendations for 

skilled nursing facility to return home, patient only to use accept home PT, 

but nothing more, patient was discharged with home health RN for blood pressure 

checks and PT for ongoing strengthening and support, patient's relative drove 

patient home post observation stay discharge 4/13/2018, when patient got to the 

one step to rise in his garage, patient started feeling weak and falling, 

patient returned to ED by family member for evaluation, in ED patient more 

agreeable now to placement for strengthening and rehabilitation, history of CAD

, BPH, multiple falls and gait instability, GERD, duodenal ulcer, C. Difficile 

colitis, chronic diastolic heart failure.  MD anticipates > 2 MN LOS for 

ongoing med nec for eval and TX of above.  Patient is Medicare Advantage which 

follows guidelines CMS puts forth.

## 2018-04-13 NOTE — ASMTCMCOM
CM Note

 

CM Note                       

Notes:

Spoke w/MD, pt refusing SNF, wants to go home. Agreed to homecare but pt left befor CM could set 

up. RN then notified CM that pt's Sister in law called her that pt was coming back to the hospital 

because she could not get him in the house, pt just sat on front step. CM notified MD, he is now in 


the ER, ED CM notified.

 

Date Signed:  04/13/2018 01:48 PM

Electronically Signed By:Sally Zheng RN

## 2018-04-13 NOTE — ASMTCMCOM
CM Note

 

CM Note                       

Notes:

Patient has returned to the ER today and is being readmitted after being admitted yesterday (see ER 


report and ER CM report from 04/12/18) and leaving the hospital earlier this afternoon.  This CM 

has communicated details with Sally AMIN, TIANNA on 3E.  CM will follow with discharge planning

 

Date Signed:  04/13/2018 04:01 PM

Electronically Signed By:Marlene Stuart RN

## 2018-04-14 VITALS — SYSTOLIC BLOOD PRESSURE: 124 MMHG | DIASTOLIC BLOOD PRESSURE: 57 MMHG

## 2018-04-14 RX ADMIN — OSELTAMIVIR PHOSPHATE SCH MG: 6 POWDER, FOR SUSPENSION ORAL at 09:43

## 2018-04-14 NOTE — GDS
[f rep st]



                                                             DISCHARGE SUMMARY





DISCHARGE DIAGNOSES:  Include:

1.  Acute influenza B.

2.  Gait instability and falls.

3.  Benign prostatic hypertrophy.



HISTORY OF PRESENT ILLNESS:  An 89-year-old male with rehospitalization within 24 hours due to gait i
nstability and fall.  Patient initially hospitalized on 04/12/2018 with fall.  Patient refused rehabi
litation placement, insisted on discharging from the hospital to home.  When he returned home, he was
 too weak and returned back to the hospital for evaluation.  For details, please see the H and P date
d 04/13/2018.



CONSULTATIVE SERVICES:  None.



PROCEDURES:  None.



HOSPITAL COURSE:  By issue:  

1.  Acute influenza B:  Patient had reported cough on his initial hospitalization.  Chest x-ray was c
hecked and was negative.  He then did not report additional symptoms, I think in the hope to obtain d
isposition home; however, upon returning to the hospital, endorses worsening rhinorrhea and cough.  P
atient tested positive for influenza B and was initiated on Tamiflu.

2.  Gait instability and fall:  Certainly may be worse in the setting of the acute influenza infectio
n.  Hopeful that with treatment and supportive care, he will regain some strength and gait stability.
  Assessments remained consistent with recommendations for placement in SNF.  Patient is now amenable
 to this disposition and will be transitioning to [facility] for rehabilitation and care.



MEDICATIONS AT THE TIME OF TRANSFER:  Please reference the med rec printed on 04/14/2018.



FOLLOWUP APPOINTMENTS:  Include in the next 2-4 weeks with his primary care provider.



PENDING STUDIES:  At the time of this dictation, none.



TIME SPENT:  I spent greater than 30 minutes in the planning and coordination of this discharge.





Job #:  [id number]

## 2018-04-14 NOTE — ASMTCMCOM
CM Note

 

CM Note                       

Notes:

Spoke w/Te[facility] they do not authorize [facility], pt has to go to [facility]. CM explained to pt, it 

is not his preference but agreed to go. Delmy at  notified.



DC Plan: SNF/[facility]



 

 

Date Signed:  04/14/2018 02:28 PM

Electronically Signed By:Sally Zheng RN

## 2018-04-14 NOTE — ASDISCHSUM
----------------------------------------------

Discharge Information

----------------------------------------------

Plan Status:SNF                                      Medically Cleared to Leave:

Discharge Date:04/14/2018 04:52 PM                    D/C Disposition:Skilled Nursing Facility

ADT D/C Disposition:Other Rehab, Not [facility]        Projected Discharge Date:04/14/2018 11:00 AM

Transportation at D/C:Wheelchair Van                 Discharge Delay Reason:

Follow-Up Date:04/14/2018 11:00 AM                   Discharge Slot:

Final Diagnosis:

----------------------------------------------

Placement Information

----------------------------------------------

Referral Type:*Nursing Home/SNF                      Referral ID:-[id number]

Provider Name:Marianna Rowley

Address 1:[address]                         Phone Number:

Address 2:                                           Fax Number:

City:[place]                                       Selection Factors:

State:[place]

 

----------------------------------------------

Patient Contact Information

----------------------------------------------

Contact Name:GUIDO                          Relationship:Other

Address:[address];  [place]                Home [phone]

                                                     Work [phone]

City:                                                Alternate Phone:

State/[facility] Code:                                      Email:

----------------------------------------------

Financial Information

----------------------------------------------

Financial Class:Medicare Advantage Plans

Primary Plan Desc:[employer] ADV IP             Primary Plan Number:[id number]

Secondary Plan Desc:                                 Secondary Plan Number:

 

 

----------------------------------------------

Assessment Information

----------------------------------------------

----------------------------------------------

[facility] CM Progress Note

----------------------------------------------

CM Note

 

CM Note                       

Notes:

Patient has returned to the ER today and is being readmitted after being admitted yesterday (see ER 


report and ER CM report from 04/12/18) and leaving the hospital earlier this afternoon.  This CM 

has communicated details with Sally AMIN CM on 3E.  CM will follow with discharge planning

 

Date Signed:  04/13/2018 04:01 PM

Electronically Signed By:Marlene Stuart RN

 

 

----------------------------------------------

[facility] CM Progress Note

----------------------------------------------

CM Note

 

CM Note                       

Notes:

TIANNA and MD spoke w/pt re; dc to snf. Pt is agreeable and would like to go to [facility]. Pt has been 


accepted but per Shirlene at , [facility] needs to authorize. 



TIANNA called [facility] auth line and left message.



DC Plan: / 



 

 

Date Signed:  04/14/2018 11:07 AM

Electronically Signed By:Salyl Zheng RN

 

 

----------------------------------------------

Case Management Discharge Plan Note

----------------------------------------------

Case Management Discharge

 

Discharge Order Complete?     Answers:  Yes                                   

Patient to Obtain             Answers:  Other                         Notes:  [facility]

Medications                                                                   

Transportation Arranged       Answers:  Other                         Notes:  [facility]

Transport will Pick (Date     04/14/2018 05:00 PM

& Time)                       

Faxed Final Orders            Answers:  Yes                                   

Agency/Facility Transfer      Answers:  Yes                                   

Report Printed & Faxed to                                                     

Receiving Agency                                                              

Discharge Comments            

Notes:

ADRY/lang SOFIA, final orders faxed. Delmy at [facility] notified, RN to call report.

 

Date Signed:  04/14/2018 02:25 PM

Electronically Signed By:Sally Zheng RN

 

 

----------------------------------------------

[facility] TIANNA Progress Note

----------------------------------------------

CM Note

 

TIANNA Note                       

Notes:

Spoke lang/Teo they do not authorize [facility], pt has to go to [facility]. TIANNA explained to pt, it 

is not his preference but agreed to go. Delmy at  notified.



DC Plan: /[facility]



 

 

Date Signed:  04/14/2018 02:28 PM

Electronically Signed By:Sally Zheng RN

 

 

----------------------------------------------

Intervention Information

----------------------------------------------

## 2018-04-14 NOTE — ASMTCMCOM
CM Note

 

TIANNA Note                       

Notes:

TIANNA and MD spoke w/pt re; dc to snf. Pt is agreeable and would like to go to [facility]. Pt has been 


accepted but per Shirlene at , [place] needs to authorize. 



TIANNA called [facility] auth line and left message.



DC Plan: [facility]/ 



 

 

Date Signed:  04/14/2018 11:07 AM

Electronically Signed By:Sally Zheng RN

## 2018-04-14 NOTE — PDIAF
- Diagnosis


Diagnosis: influenza B - falls


Code Status: Full Code





- Medication Management


Discharge Medications: 


 Medications to Continue on Transfer





Atorvastatin Calcium [Lipitor 40 mg (*)] 40 mg PO HS 01/04/15 [Last Taken 04/11/ 18]


Pantoprazole Sodium [Protonix 40mg (*)] 40 mg PO DAILY #30 tab 01/17/15 [Last 

Taken 04/13/18]


Aspirin EC [Aspirin EC 81 mg (*)] 81 mg PO DAILY 12/28/16 [Last Taken 04/12/18]


Finasteride [Proscar 5 MG (*)] 5 mg PO DAILY 12/28/16 [Last Taken 04/12/18]


Multivitamins [Multivitamin (*)] 1 each PO DAILY 04/12/18 [Last Taken 04/12/18]


Tamsulosin HCl [Flomax 0.4 MG (*)] 0.4 mg PO HS 04/12/18 [Last Taken 04/11/18]


Oseltamivir Phosphate [Tamiflu Oral Suspension] 30 mg PO BIDMEAL #8 ml 04/14/18 

[Last Taken Unknown]








Discharge Medications: Refer to the Discharge Home Medication list for PRN 

reason.





- Orders


Services needed: Registered Nurse, Physical Therapy, Occupational Therapy


Isolation Type: Droplet Isolation


Diet Recommendation: no restrictions on diet


Diet Texture: Regular Texture Diet


Additional Instructions: 


please follow with your PCP in 2-4 weeks for post discharge follow up





- Follow Up Care


Current Providers and Referrals: 


NONE *PRIMARY CARE P,. [Primary Care Provider] - As per Instructions

## 2021-05-28 NOTE — GCON
[f rep st]



                                                                    CONSULTATION





INFECTIOUS DISEASE CONSULTATION



DATE OF CONSULTATION:  12/31/2016



REFERRING PHYSICIAN:  Kelly J. Cushing, MD





REASON FOR REFERRAL:  Pneumonia and bacteremia.



HISTORY OF PRESENT ILLNESS:  Patient is an 87-year-old male who was admitted to [facility] through the emergency room on 12/28/2016.  Patient came in with near syncope.  He also stated haydee
t he had 2-3 weeks of cough as well as fevers and chills.  Blood cultures at that point eventually gr
ew Streptococcus pneumoniae.  Chest x-ray revealed a left lower lobe pneumonia.  He was begun on vanc
omycin and ertapenem.  Once the streptococcal isolate was seen in the blood, he was switched to monot
herapy with ceftriaxone.  Currently he is resting comfortably in bed.  He denies any new complaints. 
 States he is breathing comfortably.  Complains of mild pain in both knees which are symmetric.  No o
ther issues.



PAST MEDICAL HISTORY:  

1.  Coronary artery disease.

2.  Atrial fibrillation.

3.  Gastrointestinal bleed secondary to duodenal ulcer.

4.  Benign prostatic hypertrophy.

5.  C difficile colitis.

6.  Sacral decubitus ulcers.

7.  Diastolic heart failure.



PAST SURGICAL HISTORY:  

1.  Status post hip surgery.

2.  Status post ankle surgery.



ANTIBIOTICS:  Ceftriaxone.



ALLERGIES:  Patient has no known drug allergies.



SOCIAL HISTORY:  Patient lives alone.  No significant tobacco, alcohol, or drug use noted.



FAMILY HISTORY:  Noncontributory.



REVIEW OF SYSTEMS:  Other than that detailed above in his present illness, a comprehensive 10-system 
review is negative.



PHYSICAL EXAMINATION:  VITAL SIGNS:  Temperature maximum is 37.1, temperature current is 36.4, heart 
rate is 52, respiratory rate is 22, blood pressure is 108/67.  GENERAL:  Patient is a well-formed, we
ll-nourished, elderly male, in no acute distress.  He is not toxic in appearance.  He is alert.  Uncl
ear orientation.  He is pleasant in demeanor.  HEENT:  Normocephalic for age.  Atraumatic.  No sclera
l icterus.  No oral lesion.  No drainage from the nares.  Eyes:  Lids and conjunctivae are within nor
mal limits.  Pupils equal, round bilaterally.  NECK:  Supple without meningismus.  LUNGS:  Clear to a
uscultation on the right side.  Decreased breath sounds in the left lower lobe.  Left mid lung, and u
pper areas are clear to auscultation.  HEART:  Regular rate and rhythm.  No murmur, rub, or gallop no
lico.  No significant peripheral edema.  ABDOMEN:  Soft, nontender.  No masses.  SKIN:  Warm and dry t
o the touch.  No rash or lesion seen.  NEURO:  Cranial nerves 2-12 seem to be intact.  Peripheral sen
sation is intact in extremities.  MUSCULOSKELETAL:  No muscle belly tenderness is noted.  No joint en
largement, effusion, or arthritis is seen.  Of note, both knees examined.  No effusion or redness or 
warmth noted.



LABORATORY DATA:  Patient has a CBC dated 12/31/2016, shows a white blood cell count of 9.8, hemoglob
in of 10.9, hematocrit of 33.1, platelet count of 196.  Differential is left shifted with 76% segment
ed neutrophils.  Serum chemistries on 12/31/2016 show sodium 143, potassium 4.2, chloride of 112, bic
arbonate 25, BUN of 18, and creatinine of 0.9.  Urinalysis on 12/28/2016 shows 15-25 red cells and 50
-182 white cells per high-power field.  Influenza DFA on 12/28/2016 is negative.



MICROBIOLOGIC DATA:  Patient has blood cultures dated 12/28/2016 which are one out of two sets positi
ve for Streptococcus pneumoniae.  This isolate is pan sensitive.  Patient has a urine culture dated 1
2/28/2016 which shows greater than 100,000 colony-forming units of Staphylococcus aureus.  This is a 
methicillin-sensitive Staph aureus.  There are 2 other colony types unidentified at low colony counts
.



ASSESSMENT:  

1.  Streptococcus pneumoniae bacteremia with left lower lobe pneumonia.  This accounts for his presen
tation with near syncope and his symptoms of cough and fever and chills for 2-3 weeks.  He is covered
 well with ceftriaxone.

2.  Urinary tract infection.  Patient appears to have a pyuric urine, and urine culture with greater 
than 100,000 colony-forming units of methicillin-sensitive Staphylococcus aureus.  Ceftriaxone is cov
ering this as well.



PLAN:  

1.  Continue ceftriaxone monotherapy.  Plan 14 days post blood culture clearance.

2.  Follow repeat blood cultures.

3.  Follow clinical course.





Job #:  [id number]